# Patient Record
Sex: MALE | Race: WHITE | Employment: UNEMPLOYED | URBAN - METROPOLITAN AREA
[De-identification: names, ages, dates, MRNs, and addresses within clinical notes are randomized per-mention and may not be internally consistent; named-entity substitution may affect disease eponyms.]

---

## 2018-07-16 ENCOUNTER — HOSPITAL ENCOUNTER (EMERGENCY)
Facility: CLINIC | Age: 58
Discharge: HOME | End: 2018-07-16
Attending: INTERNAL MEDICINE | Admitting: INTERNAL MEDICINE
Payer: COMMERCIAL

## 2018-07-16 ENCOUNTER — APPOINTMENT (OUTPATIENT)
Dept: CT IMAGING | Facility: CLINIC | Age: 58
End: 2018-07-16
Attending: INTERNAL MEDICINE
Payer: COMMERCIAL

## 2018-07-16 VITALS
TEMPERATURE: 98.4 F | SYSTOLIC BLOOD PRESSURE: 131 MMHG | RESPIRATION RATE: 12 BRPM | HEART RATE: 76 BPM | OXYGEN SATURATION: 96 % | DIASTOLIC BLOOD PRESSURE: 88 MMHG

## 2018-07-16 DIAGNOSIS — R91.8 PULMONARY NODULES: ICD-10-CM

## 2018-07-16 DIAGNOSIS — R07.9 ACUTE CHEST PAIN: ICD-10-CM

## 2018-07-16 LAB
ALBUMIN SERPL-MCNC: 4.4 G/DL (ref 3.4–5)
ALP SERPL-CCNC: 48 U/L (ref 40–150)
ALT SERPL W P-5'-P-CCNC: 42 U/L (ref 0–70)
ANION GAP SERPL CALCULATED.3IONS-SCNC: 8 MMOL/L (ref 3–14)
AST SERPL W P-5'-P-CCNC: 26 U/L (ref 0–45)
BASOPHILS # BLD AUTO: 0.1 10E9/L (ref 0–0.2)
BASOPHILS NFR BLD AUTO: 0.9 %
BILIRUB SERPL-MCNC: 0.9 MG/DL (ref 0.2–1.3)
BUN SERPL-MCNC: 15 MG/DL (ref 7–30)
CALCIUM SERPL-MCNC: 9.2 MG/DL (ref 8.5–10.1)
CHLORIDE SERPL-SCNC: 105 MMOL/L (ref 94–109)
CO2 SERPL-SCNC: 25 MMOL/L (ref 20–32)
CREAT BLD-MCNC: 0.8 MG/DL (ref 0.66–1.25)
CREAT SERPL-MCNC: 0.79 MG/DL (ref 0.66–1.25)
DIFFERENTIAL METHOD BLD: NORMAL
EOSINOPHIL # BLD AUTO: 0.1 10E9/L (ref 0–0.7)
EOSINOPHIL NFR BLD AUTO: 1.4 %
ERYTHROCYTE [DISTWIDTH] IN BLOOD BY AUTOMATED COUNT: 13.4 % (ref 10–15)
GFR SERPL CREATININE-BSD FRML MDRD: >90 ML/MIN/1.7M2
GFR SERPL CREATININE-BSD FRML MDRD: >90 ML/MIN/1.7M2
GLUCOSE SERPL-MCNC: 100 MG/DL (ref 70–99)
HCT VFR BLD AUTO: 51 % (ref 40–53)
HGB BLD-MCNC: 16.7 G/DL (ref 13.3–17.7)
IMM GRANULOCYTES # BLD: 0 10E9/L (ref 0–0.4)
IMM GRANULOCYTES NFR BLD: 0.3 %
LYMPHOCYTES # BLD AUTO: 3.1 10E9/L (ref 0.8–5.3)
LYMPHOCYTES NFR BLD AUTO: 32 %
MCH RBC QN AUTO: 30.5 PG (ref 26.5–33)
MCHC RBC AUTO-ENTMCNC: 32.7 G/DL (ref 31.5–36.5)
MCV RBC AUTO: 93 FL (ref 78–100)
MONOCYTES # BLD AUTO: 0.8 10E9/L (ref 0–1.3)
MONOCYTES NFR BLD AUTO: 8.3 %
NEUTROPHILS # BLD AUTO: 5.5 10E9/L (ref 1.6–8.3)
NEUTROPHILS NFR BLD AUTO: 57.1 %
NRBC # BLD AUTO: 0 10*3/UL
NRBC BLD AUTO-RTO: 0 /100
PLATELET # BLD AUTO: 266 10E9/L (ref 150–450)
POTASSIUM SERPL-SCNC: 3.8 MMOL/L (ref 3.4–5.3)
PROT SERPL-MCNC: 7.9 G/DL (ref 6.8–8.8)
RBC # BLD AUTO: 5.48 10E12/L (ref 4.4–5.9)
SODIUM SERPL-SCNC: 138 MMOL/L (ref 133–144)
TROPONIN I SERPL-MCNC: <0.015 UG/L (ref 0–0.04)
WBC # BLD AUTO: 9.6 10E9/L (ref 4–11)

## 2018-07-16 PROCEDURE — 99285 EMERGENCY DEPT VISIT HI MDM: CPT | Mod: 25

## 2018-07-16 PROCEDURE — 80053 COMPREHEN METABOLIC PANEL: CPT | Performed by: INTERNAL MEDICINE

## 2018-07-16 PROCEDURE — 71260 CT THORAX DX C+: CPT

## 2018-07-16 PROCEDURE — 25000128 H RX IP 250 OP 636: Performed by: INTERNAL MEDICINE

## 2018-07-16 PROCEDURE — 85025 COMPLETE CBC W/AUTO DIFF WBC: CPT | Performed by: INTERNAL MEDICINE

## 2018-07-16 PROCEDURE — 82565 ASSAY OF CREATININE: CPT

## 2018-07-16 PROCEDURE — 84484 ASSAY OF TROPONIN QUANT: CPT | Performed by: INTERNAL MEDICINE

## 2018-07-16 PROCEDURE — 93005 ELECTROCARDIOGRAM TRACING: CPT

## 2018-07-16 RX ORDER — IOPAMIDOL 755 MG/ML
500 INJECTION, SOLUTION INTRAVASCULAR ONCE
Status: COMPLETED | OUTPATIENT
Start: 2018-07-16 | End: 2018-07-16

## 2018-07-16 RX ORDER — PANTOPRAZOLE SODIUM 40 MG/1
40 TABLET, DELAYED RELEASE ORAL DAILY
Qty: 30 TABLET | Refills: 0 | Status: SHIPPED | OUTPATIENT
Start: 2018-07-16 | End: 2019-02-07

## 2018-07-16 RX ADMIN — SODIUM CHLORIDE 80 ML: 900 INJECTION, SOLUTION INTRAVENOUS at 18:53

## 2018-07-16 RX ADMIN — IOPAMIDOL 66 ML: 755 INJECTION, SOLUTION INTRAVENOUS at 18:53

## 2018-07-16 ASSESSMENT — ENCOUNTER SYMPTOMS: NUMBNESS: 1

## 2018-07-16 NOTE — ED AVS SNAPSHOT
[facility]    [address] [place] [address]    Phone:  [phone]    Fax:  [phone]                                       Bhavin Awad   MRN: [id number]    Department:  [facility]   Date of Visit:  7/16/2018           Patient Information     Date Of Birth          1960        Your diagnoses for this visit were:     Acute chest pain     Pulmonary nodules        You were seen by Cassandra Salazar MD.      Follow-up Information     Follow up with [facility] In 3 days.    Specialty:  Family Medicine    Contact information:    [address] [facility] [address] [phone]        Discharge Instructions       Discharge Instructions  Chest Pain    You have been seen today for chest pain or discomfort.  At this time, your doctor has found no signs that your chest pain is due to a serious or life-threatening condition, (or you have declined more testing and/or admission to the hospital). However, sometimes there is a serious problem that does not show up right away. Your evaluation today may not be complete and you may need further testing and evaluation.     You need to follow-up with your regular doctor within 3 days.    Return to the Emergency Department if:    Your chest pain changes, gets worse, starts to happen more often, or comes with less activity.    You are short of breath.    You get very weak or tired.    You pass out or faint.    You have any new symptoms, like fever, cough, numb legs, or you cough up blood.    You have anything else that worries you.    Until you follow-up with your regular doctor please do the following:    Take one aspirin daily unless you have an allergy or are told not to by your doctor.    If a stress test appointment has been made, go to the appointment.    If you have questions, contact your regular doctor.    If your doctor today has told you to  follow-up with your regular doctor, it is very important that you make an appointment with your clinic and go to the appointment.  If you do not follow-up with your primary doctor, it may result in missing an important development which could result in permanent injury or disability and/or lasting pain.  If there is any problem keeping your appointment, call your doctor or return to the Emergency Department.    If you were given a prescription for medicine here today, be sure to read all of the information (including the package insert) that comes with your prescription.  This will include important information about the medicine, its side effects, and any warnings that you need to know about.  The pharmacist who fills the prescription can provide more information and answer questions you may have about the medicine.  If you have questions or concerns that the pharmacist cannot address, please call or return to the Emergency Department.         Remember that you can always come back to the Emergency Department if you are not able to see your regular doctor in the amount of time listed above, if you get any new symptoms, or if there is anything that worries you.        24 Hour Appointment Hotline       To make an appointment at any [facility], call [phone]). If you don't have a family doctor or clinic, we will help you find one. [place] clinics are conveniently located to serve the needs of you and your family.             Review of your medicines      START taking        Dose / Directions Last dose taken    pantoprazole 40 MG EC tablet   Commonly known as:  PROTONIX   Dose:  40 mg   Quantity:  30 tablet        Take 1 tablet (40 mg) by mouth daily for 30 doses   Refills:  0          Our records show that you are taking the medicines listed below. If these are incorrect, please call your family doctor or clinic.        Dose / Directions Last dose taken    IBUPROFEN PO        Refills:  0                 Prescriptions were sent or printed at these locations (1 Prescription)                   Other Prescriptions                Printed at Department/Unit printer (1 of 1)         pantoprazole (PROTONIX) 40 MG EC tablet                Procedures and tests performed during your visit     CBC with platelets differential    CT Chest Pulmonary Embolism w Contrast    Cardiac Continuous Monitoring    Comprehensive metabolic panel    Creatinine POCT    EKG 12 lead    ISTAT creatinine    Peripheral IV: Standard    Pulse oximetry nursing    Troponin I      Orders Needing Specimen Collection     None      Pending Results     Date and Time Order Name Status Description    7/16/2018 1814 CT Chest Pulmonary Embolism w Contrast Preliminary             Pending Culture Results     No orders found from 7/14/2018 to 7/17/2018.            Pending Results Instructions     If you had any lab results that were not finalized at the time of your Discharge, you can call the ED Lab Result RN at [phone]. You will be contacted by this team for any positive Lab results or changes in treatment. The nurses are available 7 days a week from 10A to 6:30P.  You can leave a message 24 hours per day and they will return your call.        Test Results From Your Hospital Stay        7/16/2018  6:37 PM      Component Results     Component Value Ref Range & Units Status    WBC 9.6 4.0 - 11.0 10e9/L Final    RBC Count 5.48 4.4 - 5.9 10e12/L Final    Hemoglobin 16.7 13.3 - 17.7 g/dL Final    Hematocrit 51.0 40.0 - 53.0 % Final    MCV 93 78 - 100 fl Final    MCH 30.5 26.5 - 33.0 pg Final    MCHC 32.7 31.5 - 36.5 g/dL Final    RDW 13.4 10.0 - 15.0 % Final    Platelet Count 266 150 - 450 10e9/L Final    Diff Method Automated Method  Final    % Neutrophils 57.1 % Final    % Lymphocytes 32.0 % Final    % Monocytes 8.3 % Final    % Eosinophils 1.4 % Final    % Basophils 0.9 % Final    % Immature Granulocytes 0.3 % Final    Nucleated RBCs 0 0 /100  Final    Absolute Neutrophil 5.5 1.6 - 8.3 10e9/L Final    Absolute Lymphocytes 3.1 0.8 - 5.3 10e9/L Final    Absolute Monocytes 0.8 0.0 - 1.3 10e9/L Final    Absolute Eosinophils 0.1 0.0 - 0.7 10e9/L Final    Absolute Basophils 0.1 0.0 - 0.2 10e9/L Final    Abs Immature Granulocytes 0.0 0 - 0.4 10e9/L Final    Absolute Nucleated RBC 0.0  Final         7/16/2018  6:57 PM      Component Results     Component Value Ref Range & Units Status    Troponin I ES <0.015 0.000 - 0.045 ug/L Final    The 99th percentile for upper reference range is 0.045 ug/L.  Troponin values   in the range of 0.045 - 0.120 ug/L may be associated with risks of adverse   clinical events.           7/16/2018  6:57 PM      Component Results     Component Value Ref Range & Units Status    Sodium 138 133 - 144 mmol/L Final    Potassium 3.8 3.4 - 5.3 mmol/L Final    Chloride 105 94 - 109 mmol/L Final    Carbon Dioxide 25 20 - 32 mmol/L Final    Anion Gap 8 3 - 14 mmol/L Final    Glucose 100 (H) 70 - 99 mg/dL Final    Urea Nitrogen 15 7 - 30 mg/dL Final    Creatinine 0.79 0.66 - 1.25 mg/dL Final    GFR Estimate >90 >60 mL/min/1.7m2 Final    Non  GFR Calc    GFR Estimate If Black >90 >60 mL/min/1.7m2 Final    African American GFR Calc    Calcium 9.2 8.5 - 10.1 mg/dL Final    Bilirubin Total 0.9 0.2 - 1.3 mg/dL Final    Albumin 4.4 3.4 - 5.0 g/dL Final    Protein Total 7.9 6.8 - 8.8 g/dL Final    Alkaline Phosphatase 48 40 - 150 U/L Final    ALT 42 0 - 70 U/L Final    AST 26 0 - 45 U/L Final         7/16/2018  7:16 PM      Narrative     CT CHEST PULMONARY EMBOLISM WITH CONTRAST  7/16/2018 7:05 PM    HISTORY:  Chest pain.    TECHNIQUE: Scans obtained from the apices through the diaphragm with  IV contrast. 66mL Isovue-370 injected. Radiation dose for this scan  was reduced using automated exposure control, adjustment of the mA  and/or kV according to patient size, or iterative reconstruction  technique.    COMPARISON:  None.    FINDINGS:   Evaluation of the pulmonary arterial system shows no  evidence of embolus. There is no aortic aneurysm or dissection. The  heart is at the upper limits of normal in size. There is no  mediastinal, hilar or axillary lymph node enlargement. There is a 0.7  cm right middle lobe nodule on image #75. Mild nodularity along the  right major fissure. There is a 0.6 cm nodule or scar in the inferior  lingula. Dependent atelectasis bilaterally. No pneumothorax or pleural  effusion. Images through the upper abdomen show no acute  abnormalities.        Impression     IMPRESSION:  1. There is no pulmonary embolus, aortic aneurysm or dissection.  2. Two small indeterminate lung nodules. Follow-up recommended.    Recommendations for an incidental lung nodule = or > 6mm to 8mm:    Low risk patients: Initial follow-up CT at 6-12 months, then  consider CT at 18-24 months if no change.    High risk patients: Initial follow-up CT at 6-12 months, then CT at  18-24 months if no change.    *Low Risk: Minimal or absent history of smoking or other known risk  factors.  *Nonsolid (ground-glass) or partly solid nodules may require longer  follow-up to exclude indolent adenocarcinoma.  *Recommendations based on Guidelines for the Management of Incidental  Pulmonary Nodules Detected at CT: From the Fleischner Society 2017,  Radiology 2017.         7/16/2018  6:34 PM      Component Results     Component Value Ref Range & Units Status    Creatinine 0.8 0.66 - 1.25 mg/dL Final    GFR Estimate >90 >60 mL/min/1.7m2 Final    GFR Estimate If Black >90 >60 mL/min/1.7m2 Final                Clinical Quality Measure: Blood Pressure Screening     Your blood pressure was checked while you were in the emergency department today. The last reading we obtained was  BP: 129/78 . Please read the guidelines below about what these numbers mean and what you should do about them.  If your systolic blood pressure (the top number) is less than 120 and your diastolic  "blood pressure (the bottom number) is less than 80, then your blood pressure is normal. There is nothing more that you need to do about it.  If your systolic blood pressure (the top number) is 120-139 or your diastolic blood pressure (the bottom number) is 80-89, your blood pressure may be higher than it should be. You should have your blood pressure rechecked within a year by a primary care provider.  If your systolic blood pressure (the top number) is 140 or greater or your diastolic blood pressure (the bottom number) is 90 or greater, you may have high blood pressure. High blood pressure is treatable, but if left untreated over time it can put you at risk for heart attack, stroke, or kidney failure. You should have your blood pressure rechecked by a primary care provider within the next 4 weeks.  If your provider in the emergency department today gave you specific instructions to follow-up with your doctor or provider even sooner than that, you should follow that instruction and not wait for up to 4 weeks for your follow-up visit.        Thank you for choosing [facility]       Thank you for choosing [facility] for your care. Our goal is always to provide you with excellent care. Hearing back from our patients is one way we can continue to improve our services. Please take a few minutes to complete the written survey that you may receive in the mail after you visit with us. Thank you!        [facility] Information     [facility] lets you send messages to your doctor, view your test results, renew your prescriptions, schedule appointments and more. To sign up, go to [email or web address] . Click on \"Log in\" on the left side of the screen, which will take you to the Welcome page. Then click on \"Sign up Now\" on the right side of the page.     You will be asked to enter the access code listed below, as well as some personal information. Please follow the directions to create your username and password.     Your access code " is: [email or web address]  Expires: 10/14/2018  7:48 PM     Your access code will  in 90 days. If you need help or a new code, please call your [facility] clinic or [phone].        Care EveryWhere ID     This is your Care EveryWhere ID. This could be used by other organizations to access your [facility] medical records  [id number]        Equal Access to Services     RANDA ORTIZ : Hadii renato coppolao Sobarb, waaxda luqadaha, qaybta kaalmada adeegyada, mac villalobos . So [facility] [phone].    ATENCIÓN: Si habla marryañol, tiene a navarrete disposición servicios gratuitos de asistencia lingüística. Llame al [phone].    We comply with applicable federal civil rights laws and [place] laws. We do not discriminate on the basis of race, color, national origin, age, disability, sex, sexual orientation, or gender identity.            After Visit Summary       This is your record. Keep this with you and show to your community pharmacist(s) and doctor(s) at your next visit.

## 2018-07-16 NOTE — ED TRIAGE NOTES
Patient presents to the ED with chest pain for the past week. States pain is worse with activity and improves with rest, but never completely goes away. Also reports numbness in the left arm.

## 2018-07-16 NOTE — ED PROVIDER NOTES
History     Chief Complaint:  Chest Pain    HPI   Bhavin Awad is a 57 year old male who presents to the emergency department for evaluation of chest pain. The patient states that he has had chest tightness since the 4th of July. He notes pain is worse with activity and better with rest. It is localized just to the left of the sternum. This pain is constant and has not really gone away since its onset. The patient also numbness in the left arm and a cough. He has been feeling fatigued and diaphoretic off and on all day, even while in an air conditioned room. He also feels as though he has been losing weight recently. The patient reports that he has been feeling more stressed lately. He denies any blood when he coughs.     Cardiac/PE/DVT Risk Factors:  History of hypertension - neg  History of hyperlipidemia - neg  History of diabetes - neg  History of smoking - pos  Personal history of PE/DVT - neg  Family history of PE/DVT - neg  Recent travel - neg  Recent surgery - neg  Other immobilizations - neg  Cancer - neg    Allergies:  No known Drug Allergies     Medications:    ibuprofen     Past Medical History:    Depression  Low back pain   Rotator cuff disorder    Past Surgical History:    Explore shoulder joint  Herniorrhaphy inguinal bilateral     Family History:    Father- CAD  Paternal grandfather- CAD  Mother- diabetes  Father- CV disease     Social History:  Marital Status:   [2]  Social History   Substance Use Topics     Smoking status: Current Every Day Smoker     Packs/day: 1.00     Smokeless tobacco: Never Used     Alcohol use Yes      Comment: sobriety one year        Review of Systems   Cardiovascular: Positive for chest pain.   Neurological: Positive for numbness.   All other systems reviewed and are negative.         Physical Exam   First Vitals:  BP: (!) 165/100  Pulse: 76  Heart Rate: 78  Temp: 98.4  F (36.9  C)  Resp: 18  SpO2: 100 %      Physical Exam   Constitutional: He is cooperative.    HENT:   Right Ear: Tympanic membrane normal.   Left Ear: Tympanic membrane normal.   Mouth/Throat: Oropharynx is clear and moist and mucous membranes are normal.   Eyes: Conjunctivae are normal.   Neck: Normal range of motion.   Cardiovascular: Regular rhythm and normal heart sounds.    Pulmonary/Chest: Effort normal and breath sounds normal.   Abdominal: Soft. Normal appearance and bowel sounds are normal. There is no rebound and no guarding.   Musculoskeletal: Normal range of motion.   Lymphadenopathy:     He has no cervical adenopathy.   Neurological: He is alert.   Skin: Skin is warm and dry.   Psychiatric: He has a normal mood and affect.       Emergency Department Course     ECG:  ECG taken at 1743, ECG read at 1813  Normal sinus rhythm with sinus arrhythmia   Left anterior fascicular block  Abnormal ECG  Rate 68 bpm. NM interval 170 ms. QRS duration 110 ms. QT/QTc 382/406 ms. P-R-T axes 60 -68 71.    Imaging:  Radiology findings were communicated with the patient who voiced understanding of the findings.    CT Chest Pulmonary Embolism w Contrast  Preliminary Result  IMPRESSION:  1. There is no pulmonary embolus, aortic aneurysm or dissection.  2. Two small indeterminate lung nodules. Follow-up recommended.    Recommendations for an incidental lung nodule = or > 6mm to 8mm:    Low risk patients: Initial follow-up CT at 6-12 months, then  consider CT at 18-24 months if no change.    High risk patients: Initial follow-up CT at 6-12 months, then CT at  18-24 months if no change.    *Low Risk: Minimal or absent history of smoking or other known risk  factors.  *Nonsolid (ground-glass) or partly solid nodules may require longer  follow-up to exclude indolent adenocarcinoma.  *Recommendations based on Guidelines for the Management of Incidental  Pulmonary Nodules Detected at CT: From the Fleischner Society 2017,  Radiology 2017.  Readings are preliminary at time of note     Laboratory:  Laboratory findings were  communicated with the patient who voiced understanding of the findings.    Creatinine POCT: negative  Troponin (Collected 1829): <0.015   CBC: WBC 9.6, HGB 16.7,   CMP: glucose 100 (H), o/w WNL (Creatinine 0.79)    Interventions:  1854 NS 1L IV Bolus     Emergency Department Course:  Nursing notes and vitals reviewed.  I performed an exam of the patient as documented above.   I discussed the treatment plan with the patient. They expressed understanding of this plan and consented to discharge. They will be discharged home with instructions for care and follow up. In addition, the patient will return to the emergency department if their symptoms persist, worsen, if new symptoms arise or if there is any concern.  All questions were answered.     I personally reviewed the imaging and lab results with the patient and answered all related questions prior to discharge.  Impression & Plan      Medical Decision Making:    Bhavin Awad is a 57 year old male who presents to the emergency department complaining of over 10 days of ongoing left anterior chest pain.  With constant pain for such a long period of time and normal EKG and troponin this is not consistent with cardiac ischemia.  He was not high risk for pulmonary embolus but I was concerned with his symptoms especially with his anorexia and unintentional weight loss.  CT was obtained.  This shows no evidence of PE.  There are 2 small indeterminate pulmonary nodules.  I discussed with him recommendation of follow-up CT in 6-12 months.   No other significant laboratory abnormality.  It does sound like he has frequent heartburn and this could be of esophageal origin.  I will start him on Protonix.  He mentions anxiety is a potential contributor.  I will have him follow-up with primary care to discuss this further.  Chest pain instructions, return if problems.      Diagnosis:    ICD-10-CM    1. Acute chest pain R07.9    2. Pulmonary nodules R91.8      Disposition:    Discharged     Discharge Medications:  Discharge Medication List as of 7/16/2018  7:48 PM      START taking these medications    Details   pantoprazole (PROTONIX) 40 MG EC tablet Take 1 tablet (40 mg) by mouth daily for 30 doses, Disp-30 tablet, R-0, Local Print             Scribe Disclosure:  I, Mitch Chavezabel, am serving as a scribe at 6:17 PM on 7/16/2018 to document services personally performed by Cassandra Salazar MD, based on my observations and the provider's statements to me.     [facility] EMERGENCY DEPARTMENT       Cassandra Salazar MD  07/16/18 4782

## 2018-07-16 NOTE — ED AVS SNAPSHOT
[facility]    [address]    [facility] [address]    Phone:  [phone]    Fax:  [phone]                                       Bhavin Awad   MRN: [id number]    Department:  [facility] Department   Date of Visit:  7/16/2018           After Visit Summary Signature Page     I have received my discharge instructions, and my questions have been answered. I have discussed any challenges I see with this plan with the nurse or doctor.    ..........................................................................................................................................  Patient/Patient Representative Signature      ..........................................................................................................................................  Patient Representative Print Name and Relationship to Patient    ..................................................               ................................................  Date                                            Time    ..........................................................................................................................................  Reviewed by Signature/Title    ...................................................              ..............................................  Date                                                            Time

## 2018-07-17 LAB — INTERPRETATION ECG - MUSE: NORMAL

## 2018-07-17 NOTE — DISCHARGE INSTRUCTIONS
Discharge Instructions  Chest Pain    You have been seen today for chest pain or discomfort.  At this time, your doctor has found no signs that your chest pain is due to a serious or life-threatening condition, (or you have declined more testing and/or admission to the hospital). However, sometimes there is a serious problem that does not show up right away. Your evaluation today may not be complete and you may need further testing and evaluation.     You need to follow-up with your regular doctor within 3 days.    Return to the Emergency Department if:    Your chest pain changes, gets worse, starts to happen more often, or comes with less activity.    You are short of breath.    You get very weak or tired.    You pass out or faint.    You have any new symptoms, like fever, cough, numb legs, or you cough up blood.    You have anything else that worries you.    Until you follow-up with your regular doctor please do the following:    Take one aspirin daily unless you have an allergy or are told not to by your doctor.    If a stress test appointment has been made, go to the appointment.    If you have questions, contact your regular doctor.    If your doctor today has told you to follow-up with your regular doctor, it is very important that you make an appointment with your clinic and go to the appointment.  If you do not follow-up with your primary doctor, it may result in missing an important development which could result in permanent injury or disability and/or lasting pain.  If there is any problem keeping your appointment, call your doctor or return to the Emergency Department.    If you were given a prescription for medicine here today, be sure to read all of the information (including the package insert) that comes with your prescription.  This will include important information about the medicine, its side effects, and any warnings that you need to know about.  The pharmacist who fills the prescription can  provide more information and answer questions you may have about the medicine.  If you have questions or concerns that the pharmacist cannot address, please call or return to the Emergency Department.         Remember that you can always come back to the Emergency Department if you are not able to see your regular doctor in the amount of time listed above, if you get any new symptoms, or if there is anything that worries you.

## 2018-07-24 ENCOUNTER — PATIENT OUTREACH (OUTPATIENT)
Dept: CARE COORDINATION | Facility: CLINIC | Age: 58
End: 2018-07-24

## 2018-07-24 ENCOUNTER — OFFICE VISIT (OUTPATIENT)
Dept: FAMILY MEDICINE | Facility: CLINIC | Age: 58
End: 2018-07-24
Payer: COMMERCIAL

## 2018-07-24 VITALS
WEIGHT: 184 LBS | BODY MASS INDEX: 27.25 KG/M2 | DIASTOLIC BLOOD PRESSURE: 98 MMHG | HEIGHT: 69 IN | SYSTOLIC BLOOD PRESSURE: 142 MMHG | HEART RATE: 68 BPM | TEMPERATURE: 98 F | RESPIRATION RATE: 18 BRPM

## 2018-07-24 DIAGNOSIS — Z23 NEED FOR PROPHYLACTIC VACCINATION WITH TETANUS-DIPHTHERIA (TD): ICD-10-CM

## 2018-07-24 DIAGNOSIS — Z11.59 NEED FOR HEPATITIS C SCREENING TEST: ICD-10-CM

## 2018-07-24 DIAGNOSIS — R07.89 NON-CARDIAC CHEST PAIN: Primary | ICD-10-CM

## 2018-07-24 DIAGNOSIS — M54.2 CERVICALGIA: ICD-10-CM

## 2018-07-24 DIAGNOSIS — M25.512 LEFT SHOULDER PAIN, UNSPECIFIED CHRONICITY: ICD-10-CM

## 2018-07-24 DIAGNOSIS — Z23 NEED FOR VACCINATION: ICD-10-CM

## 2018-07-24 DIAGNOSIS — Z59.00 HOMELESS: ICD-10-CM

## 2018-07-24 DIAGNOSIS — Z12.11 SCREEN FOR COLON CANCER: ICD-10-CM

## 2018-07-24 DIAGNOSIS — M25.561 RIGHT KNEE PAIN, UNSPECIFIED CHRONICITY: ICD-10-CM

## 2018-07-24 DIAGNOSIS — F33.9 RECURRENT MAJOR DEPRESSIVE DISORDER, REMISSION STATUS UNSPECIFIED (H): ICD-10-CM

## 2018-07-24 DIAGNOSIS — F81.9 LEARNING DISABILITY: ICD-10-CM

## 2018-07-24 DIAGNOSIS — M25.562 LEFT KNEE PAIN, UNSPECIFIED CHRONICITY: ICD-10-CM

## 2018-07-24 DIAGNOSIS — Z11.4 SCREENING FOR HIV (HUMAN IMMUNODEFICIENCY VIRUS): ICD-10-CM

## 2018-07-24 DIAGNOSIS — R91.8 PULMONARY NODULES: ICD-10-CM

## 2018-07-24 PROCEDURE — 90732 PPSV23 VACC 2 YRS+ SUBQ/IM: CPT | Performed by: FAMILY MEDICINE

## 2018-07-24 PROCEDURE — 90715 TDAP VACCINE 7 YRS/> IM: CPT | Performed by: FAMILY MEDICINE

## 2018-07-24 PROCEDURE — 90471 IMMUNIZATION ADMIN: CPT | Performed by: FAMILY MEDICINE

## 2018-07-24 PROCEDURE — 87389 HIV-1 AG W/HIV-1&-2 AB AG IA: CPT | Performed by: FAMILY MEDICINE

## 2018-07-24 PROCEDURE — 99214 OFFICE O/P EST MOD 30 MIN: CPT | Mod: 25 | Performed by: FAMILY MEDICINE

## 2018-07-24 PROCEDURE — 36415 COLL VENOUS BLD VENIPUNCTURE: CPT | Performed by: FAMILY MEDICINE

## 2018-07-24 PROCEDURE — 90472 IMMUNIZATION ADMIN EACH ADD: CPT | Performed by: FAMILY MEDICINE

## 2018-07-24 PROCEDURE — 86803 HEPATITIS C AB TEST: CPT | Performed by: FAMILY MEDICINE

## 2018-07-24 SDOH — ECONOMIC STABILITY - HOUSING INSECURITY: HOMELESSNESS UNSPECIFIED: Z59.00

## 2018-07-24 ASSESSMENT — ANXIETY QUESTIONNAIRES
2. NOT BEING ABLE TO STOP OR CONTROL WORRYING: MORE THAN HALF THE DAYS
6. BECOMING EASILY ANNOYED OR IRRITABLE: NEARLY EVERY DAY
IF YOU CHECKED OFF ANY PROBLEMS ON THIS QUESTIONNAIRE, HOW DIFFICULT HAVE THESE PROBLEMS MADE IT FOR YOU TO DO YOUR WORK, TAKE CARE OF THINGS AT HOME, OR GET ALONG WITH OTHER PEOPLE: VERY DIFFICULT
1. FEELING NERVOUS, ANXIOUS, OR ON EDGE: MORE THAN HALF THE DAYS
GAD7 TOTAL SCORE: 15
5. BEING SO RESTLESS THAT IT IS HARD TO SIT STILL: MORE THAN HALF THE DAYS
3. WORRYING TOO MUCH ABOUT DIFFERENT THINGS: MORE THAN HALF THE DAYS
7. FEELING AFRAID AS IF SOMETHING AWFUL MIGHT HAPPEN: MORE THAN HALF THE DAYS

## 2018-07-24 ASSESSMENT — ACTIVITIES OF DAILY LIVING (ADL): DEPENDENT_IADLS:: TRANSPORTATION;INDEPENDENT

## 2018-07-24 ASSESSMENT — PATIENT HEALTH QUESTIONNAIRE - PHQ9: 5. POOR APPETITE OR OVEREATING: MORE THAN HALF THE DAYS

## 2018-07-24 NOTE — LETTER
July 26, 2018      Bhavin Awad  [id number] [facility] [address]        Dear ,    We are writing to inform you of your test results.    These tests are all normal. GREAT!    Resulted Orders   Hepatitis C Screen Reflex to HCV RNA Quant and Genotype   Result Value Ref Range    Hepatitis C Antibody Nonreactive NR^Nonreactive      Comment:      Assay performance characteristics have not been established for newborns,   infants, and children     HIV Screening   Result Value Ref Range    HIV Antigen Antibody Combo Nonreactive NR^Nonreactive          Comment:      HIV-1 p24 Ag & HIV-1/HIV-2 Ab Not Detected       If you have any questions or concerns, please call the clinic at the number listed above.       Sincerely,        Andres Leal MD/LF
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
## 2018-07-24 NOTE — PROGRESS NOTES
SUBJECTIVE:   Bhavin Awad is a 57 year old male who presents to clinic today for the following health issues:      ED/UC Followup:    Facility:  [facility]  Date of visit: 7/16/18  Reason for visit: Chest pain   Current Status: Comes and goes          Abnormal Mood Symptoms  Onset: 3 years Dx psychotic depression 2004    Description:   Depression: YES  Anxiety: YES    Accompanying Signs & Symptoms:  Still participating in activities that you used to enjoy: no  Fatigue: YES  Irritability: YES  Difficulty concentrating: YES  Changes in appetite: YES  Problems with sleep: YES  Heart racing/beating fast : YES  Thoughts of hurting yourself or others: attempt made 2 twice 2000's     History:   Recent stress: YES  Prior depression hospitalization: early 2000's   Family history of depression: no   Family history of anxiety: no     Precipitating factors:   Alcohol/drug use: YES    Alleviating factors:      Therapies Tried and outcome:     Problem list and histories reviewed & adjusted, as indicated.  Additional history: none        Reviewed and updated as needed this visit by clinical staff  Tobacco  Allergies  Meds  Med Hx  Surg Hx  Fam Hx  Soc Hx      Reviewed and updated as needed this visit by Provider         Non-cardiac chest pain  (primary encounter diagnosis)  He has yet to get his Rx PPI  Pulmonary nodules f/u CT 6 mos  Learning disability he reports math reading comprehension issues life long  Recurrent major depressive disorder, remission status unspecified (H)   PHQ-9 SCORE 7/24/2018   Total Score 19       Screen for colon cancer needed  Need for hepatitis C screening test needed  Screening for HIV (human immunodeficiency virus) needed  Need for prophylactic vaccination with tetanus-diphtheria (TD) needed  Homeless living with son, he must leave by end August  Cervicalgia  Left shoulder pain, unspecified chronicity  Left knee pain, unspecified chronicity  Right knee pain, unspecified chronicity He complains  "of various MSK pains, noted: not addressed today  Need for vaccination.hist    Past Medical History:   Diagnosis Date     Cervicalgia 2018     DEPRESS PSYCHOSIS-SEVERE(aka DEPRESSION) 2004    admitted [facility] in 2004 , dr gonzales psychiatrist, significant suicidal ideation, and attempts       Homeless 2018     LBP (low back pain)      Learning disability 2018     Left knee pain, unspecified chronicity 2018     Left shoulder pain, unspecified chronicity 2018     Non-cardiac chest pain 2018     Pulmonary nodules 2018     Right knee pain, unspecified chronicity 2018     Rotator cuff disorder        Past Surgical History:   Procedure Laterality Date     C EXPLORE SHOULDER JOINT      Arthroplasty, Shoulder, simonet, rotator cuff     HERNIORRHAPHY INGUINAL BILATERAL Bilateral 2015    Procedure: HERNIORRHAPHY INGUINAL BILATERAL;  Surgeon: Luís Conti MD;  Location: [facility] OR       Family History   Problem Relation Age of Onset     C.A.D. Father       age 66 MI      Cerebrovascular Disease Father      C.A.CAMRON. Paternal Grandfather       in his 50 heart disease     Diabetes Mother        Social History   Substance Use Topics     Smoking status: Current Every Day Smoker     Packs/day: 1.00     Smokeless tobacco: Never Used     Alcohol use No      Comment: sobriety one year   unemployed no insurance    ROS no fevers dysphagia suicidality. High anxiety    BP (!) 142/98 (BP Location: Right arm, Patient Position: Chair, Cuff Size: Adult Large)  Pulse 68  Temp 98  F (36.7  C) (Oral)  Resp 18  Ht 5' 9\" (1.753 m)  Wt 184 lb (83.5 kg)  BMI 27.17 kg/m2  No synovitis  RRR  Hyper vigilant  Poor dentition    ASSESSMENT / PLAN:  (R07.89) Non-cardiac chest pain  (primary encounter diagnosis)  Comment: ED eval reassuring  Plan: he cannot afford his PPI.  See below    (R91.8) Pulmonary nodules  Comment: smoker  Plan: repeat CT January    (F81.9) " Learning disability  Comment: MH issue   Plan: CARE COORDINATION REFERRAL, MENTAL HEALTH         REFERRAL  - Adult; Outpatient Treatment;         Individual/Couples/Family/Group Therapy/Health         Psychology; [facility]: [facility]         [phone]; We will contact you to schedule        the appointment or please call with any         questions        Interested in counseling    (F33.9) Recurrent major depressive disorder, remission status unspecified (H)  Comment: on no therapies  Plan: CARE COORDINATION REFERRAL, MENTAL HEALTH         REFERRAL  - Adult; Outpatient Treatment;         Individual/Couples/Family/Group Therapy/Health         Psychology; [facility]: [facility]         [phone]; We will contact you to schedule        the appointment or please call with any         questions        Interested in counseling    (Z12.11) Screen for colon cancer  Comment:due  Plan: Fecal colorectal cancer screen FIT - Future         (S+30)        dispense    (Z11.59) Need for hepatitis C screening test  Comment: routine  Plan: Hepatitis C Screen Reflex to HCV RNA Quant and         Genotype        Hep C  testing introduced, rationale reviewed, all questions answered, permission granted to test      (Z11.4) Screening for HIV (human immunodeficiency virus)  Comment: routine  Plan: HIV Screening        Universal HIV testing introduced, rationale reviewed, all questions answered, permission granted to test      (Z23) Need for prophylactic vaccination with tetanus-diphtheria (TD)  Comment: see below  Plan: CANCELED: TDAP VACCINE (BOOSTRIX)            (Z59.0) Homeless  Comment: [facility] coordination  Plan:     (M54.2) Cervicalgia  Comment:  Long standing, not urgent  Plan:  to be addressed at another visit    (N85.512) Left shoulder pain, unspecified chronicity  Comment:  Long standing, not urgent  Plan:  to be addressed at another visit*    (M25.562) Left knee pain, unspecified chronicity  Comment:   Long standing, not urgent  Plan:  to be addressed at another visit*    (M25.561) Right knee pain, unspecified chronicity  Comment:  Long standing, not urgent  Plan:  to be addressed at another visit    (Z23) Need for vaccination  Comment:offered  Plan: TDAP VACCINE (ADACEL), Pneumococcal vaccine 23         valent PPSV23  (Pneumovax) [56593], ADMIN:         Vaccine, Initial (09594)        Will accept            Andres Leal MD

## 2018-07-24 NOTE — MR AVS SNAPSHOT
After Visit Summary   7/24/2018    Bhavin Awad    MRN: [id number]           Patient Information     Date Of Birth          1960        Visit Information        Provider Department      7/24/2018 10:00 AM Andres Leal MD [facility]        Today's Diagnoses     Non-cardiac chest pain    -  1    Pulmonary nodules        Learning disability        Recurrent major depressive disorder, remission status unspecified (H)        Screen for colon cancer        Need for hepatitis C screening test        Screening for HIV (human immunodeficiency virus)        Need for prophylactic vaccination with tetanus-diphtheria (TD)        Homeless        Cervicalgia        Left shoulder pain, unspecified chronicity        Left knee pain, unspecified chronicity        Right knee pain, unspecified chronicity        Need for vaccination           Follow-ups after your visit        Additional Services     CARE COORDINATION REFERRAL       Services are provided by a Care Coordinator for people with complex needs such as: medical, social, or financial troubles.  The Care Coordinator works with the patient and their Primary Care Provider to determine health goals, obtain resources, achieve outcomes, and develop care plans that help coordinate the patient's care.     Reason for Referral: Homeless unemployed learning disability    Additional pertinent details:      Clinical Staff have discussed the Care Coordination Referral with the patient and/or caregiver: yes            MENTAL HEALTH REFERRAL  - Adult; Outpatient Treatment; Individual/Couples/Family/Group Therapy/Health Psychology; [facility]: [facility] [phone]; We will contact you to schedule the appointment or please call with any questions       All scheduling is subject to the client's specific insurance plan & benefits, provider/location availability, and provider clinical specialities.  Please arrive 15 minutes early for your  "first appointment and bring your completed paperwork.    Please be aware that coverage of these services is subject to the terms and limitations of your health insurance plan.  Call member services at your health plan with any benefit or coverage questions.                            Follow-up notes from your care team     Return in about 6 weeks (around 9/4/2018).      Your next 10 appointments already scheduled     Sep 04, 2018  9:00 AM CDT   SHORT with Andres Leal MD   [facility]    [address] [place] [id number] [phone]              Future tests that were ordered for you today     Open Future Orders        Priority Expected Expires Ordered    Fecal colorectal cancer screen FIT - Future (S+30) Routine 8/14/2018 8/23/2018 7/24/2018            Who to contact     If you have questions or need follow up information about today's clinic visit or your schedule please contact [facility] directly at [phone].  Normal or non-critical lab and imaging results will be communicated to you by MyChart, letter or phone within 4 business days after the clinic has received the results. If you do not hear from us within 7 days, please contact the clinic through [facility] or phone. If you have a critical or abnormal lab result, we will notify you by phone as soon as possible.  Submit refill requests through [facility] or call your pharmacy and they will forward the refill request to us. Please allow 3 business days for your refill to be completed.          Additional Information About Your Visit        [facility] Information     [facility] lets you send messages to your doctor, view your test results, renew your prescriptions, schedule appointments and more. To sign up, go to [email or web address] . Click on \"Log in\" on the left side of the screen, which will take you to the Welcome page. Then click on \"Sign up Now\" on the right side of " "the page.     You will be asked to enter the access code listed below, as well as some personal information. Please follow the directions to create your username and password.     Your access code is: [email or web address]  Expires: 10/14/2018  7:48 PM     Your access code will  in 90 days. If you need help or a new code, please call your [facility] clinic or [phone].        Care EveryWhere ID     This is your Care EveryWhere ID. This could be used by other organizations to access your [facility] medical records  [id number]        Your Vitals Were     Pulse Temperature Respirations Height BMI (Body Mass Index)       68 98  F (36.7  C) (Oral) 18 5' 9\" (1.753 m) 27.17 kg/m2        Blood Pressure from Last 3 Encounters:   18 (!) 142/98   18 131/88   08/17/15 118/70    Weight from Last 3 Encounters:   18 184 lb (83.5 kg)   08/17/15 171 lb (77.6 kg)   07/28/15 166 lb (75.3 kg)              We Performed the Following     ADMIN: Vaccine, Initial (27294)     CARE COORDINATION REFERRAL     Hepatitis C Screen Reflex to HCV RNA Quant and Genotype     HIV Screening     MENTAL HEALTH REFERRAL  - Adult; Outpatient Treatment; Individual/Couples/Family/Group Therapy/Health Psychology; G: [facility] [phone]; We will contact you to schedule the appointment or please call with any questions     Pneumococcal vaccine 23 valent PPSV23  (Pneumovax) [62500]     TDAP VACCINE (ADACEL)        Primary Care Provider Fax #    Physician No Ref-Primary [phone]       No address on file        Equal Access to Services     RANDA ORTIZ : mary Shepherd, mac viera. So [facility] [phone].    ATENCIÓN: Si habla español, tiene a navarrete disposición servicios gratuitos de asistencia lingüística. Llame al [phone].    We comply with applicable federal civil rights laws and [place] laws. We do not discriminate on the " basis of race, color, national origin, age, disability, sex, sexual orientation, or gender identity.            Thank you!     Thank you for choosing [facility]  for your care. Our goal is always to provide you with excellent care. Hearing back from our patients is one way we can continue to improve our services. Please take a few minutes to complete the written survey that you may receive in the mail after your visit with us. Thank you!             Your Updated Medication List - Protect others around you: Learn how to safely use, store and throw away your medicines at www.disposemymeds.org.          This list is accurate as of 7/24/18 11:59 PM.  Always use your most recent med list.                   Brand Name Dispense Instructions for use Diagnosis    ASPIRIN PO      Take 324 mg by mouth every 4 hours as needed for moderate pain        pantoprazole 40 MG EC tablet    PROTONIX    30 tablet    Take 1 tablet (40 mg) by mouth daily for 30 doses

## 2018-07-25 ENCOUNTER — PATIENT OUTREACH (OUTPATIENT)
Dept: CARE COORDINATION | Facility: CLINIC | Age: 58
End: 2018-07-25

## 2018-07-25 LAB
HCV AB SERPL QL IA: NONREACTIVE
HIV 1+2 AB+HIV1 P24 AG SERPL QL IA: NONREACTIVE

## 2018-07-25 ASSESSMENT — ANXIETY QUESTIONNAIRES: GAD7 TOTAL SCORE: 15

## 2018-07-25 ASSESSMENT — PATIENT HEALTH QUESTIONNAIRE - PHQ9: SUM OF ALL RESPONSES TO PHQ QUESTIONS 1-9: 19

## 2018-07-25 NOTE — PROGRESS NOTES
Clinic Care Coordination Contact  [facility]/Voicemail       Clinical Data: Care Coordinator Outreach per RNCC request to follow up with Pt regarding financial concerns.  RNCC met with Pt in clinic yesterday, 7/24/18 and Pt reported he was interested in looking into financial supports with the county and applying for social security.  Per notes in chart, RNCC provided Pt with the disability linkage line to assist with social security process.  Per notes in chart, PCP also placed a MH referral for Pt as he was interested in following up with a therapist.  Outreached to follow up on these resources per RNCC request and to provide additional supportive resources if needed.      Outreach attempted x 1.  No voicemail set up.      Plan: Care Coordinator will attempt to follow up with Pt again if no return call.      Clinic Care Coordination Contact    Clinic Care Coordination Contact  OUTREACH    Referral Information: RNCC met with Pt and requested [facility] outreach to discuss financial resources.  Outreached to Pt and he was open to outreach.      Primary Diagnosis: Psychosocial    Chief Complaint   Patient presents with     Clinic Care Coordination - Follow-up     Follow up per RNCC request regardinig financial concerns.          Universal Utilization: Pt sees Dr. Leal at [facility].    Clinic Utilization  Difficulty keeping appointments:: No  Utilization    Last refreshed: 7/26/2018  8:01 AM:  No Show Count (past year) 0       Last refreshed: 7/26/2018  8:01 AM:  ED visits 1       Last refreshed: 7/26/2018  8:01 AM:  Hospital admissions 0          Current as of: 7/26/2018  8:01 AM           Clinical Concerns:  Current Medical Concerns:  Pt follows up with PCP for medical health concerns.  Pt was seen by PCP on 7/24/18 to follow up on recent ED visit d/t acute chest pain.  Pt has a follow up PCP appointment scheduled on 9/4/18 as well. Pt can follow up with clinic directly if he has questions or concerns before  his next appointment.       Current Behavioral Concerns: Pt has reported some concerns with depression and feeling down because he has been asked to leave his son's home in the future.  Pt reported today that he is feeling safe and had no concerns of harming himself.  Pt was referred to [facility] for therapy and Pt is in agreement to talk with a therapist for support.  [facility] is to call Pt to set up intake appointment and Pt was encouraged to outreach to counseling center if he does not hear back from center soon.       Education Provided to patient: Education on rides, financial assistance, and therapy appointment was provided to Pt today.       Health Maintenance Reviewed: Up to date  Clinical Pathway: None     Medication Management:  Managed by PCP.  No medication questions from Pt today.     Living Situation: Pt is currently residing with his son and daughter in law.  Pt reported that he has been asked to find a different residence in the future and this appears to be causing Pt some stress.  Pt plans to talk with his financial worker this week to inquire abbot additional financial resources and options for support.       Diet/Exercise/Sleep: No atddressed today.        Transportation:  Transportation concerns (GOAL):: Yes  Transportation means:: Family, Medical transport  Goal created for transportation.  Pt reported his family used to drive him to appointments, but this is becoming more difficult.  [employer] [place] transportation number and services provided to Pt today.       Psychosocial:  Financial/Insurance: [employer] [place]   Financial/Insurance concerns (GOAL):: Yes  Financial goal created with Pt today.  He plans to outreach to his financial worker with the [facility] this week to obtain information on services available to him.       Resources and Interventions:  Current Resources: Financial worker with the [facility],  Medical transport with his insurance.   therapist in process of  being set up.       Goals:   Goals        General    1: Financial Wellbeing (pt-stated)     Notes - Note created  7/26/2018 10:52 AM by Iman Dumont LSW    Goal Statement: I will obtain additional financial resources within 1-3 months to support my overall health and to obtain alternative housing.    Measure of Success: I will verbally report my updates to the care coordination team and PCP.   Supportive Steps to Achieve: I will outreach to my financial worker this week and inquire about cash assistance and have this worker send me the necessary forms to obtain additional support.  I will inquire about housing assistance as well when I talk with my financial worker.  I have agreed to outreach to the care coordination team after I talk with my financial worker if questions arise.   Barriers: I am currently residing with my son and may need to leave this residence in the future.    Strengths: I appear motivated to achieve additional financial supports and resources.    Date to Achieve By: 9/1/18        2: Transportation (pt-stated)     Notes - Note created  7/26/2018 10:56 AM by Iman Dumont LSW    Goal Statement: I will obtain rides through my insurance in 1-2 weeks for all upcoming medical appointments to ensure I am able to follow up with providers as recommended.    Measure of Success: I will verbally update care team after I talk with insurance or if questions arise.    Supportive Steps to Achieve: [facility] provided me with the [facility] [place] number to call today to line up medical rides. I was educated on this service today and reminded of the timeline to set up rides.  I can outreach to care coordination team if I have questions or concerns regarding transportation.   Barriers: Family rides are becoming difficult to line up, therefore I am looking for additional resources.    Strengths: I appear motivated to attend all of my medical and MH appointments.    Date to Achieve By: 8/15/18                 Patient/Caregiver understanding: Pt voiced his understanding today and was thankful for the follow up call.     Outreach Frequency: monthly  Future Appointments              In 1 month Andres Leal MD [facility]          Plan: Pt will outreach to financial worker and [employer] for rides.  He will schedule first intake appointment with [facility] when they outreach to him.  Care coordination team can follow up with Pt to check on status of connection with financial worker and rides.  Pt was in agreement with this plan and agreed to outreach before that time if needed.

## 2018-08-14 ENCOUNTER — PATIENT OUTREACH (OUTPATIENT)
Dept: CARE COORDINATION | Facility: CLINIC | Age: 58
End: 2018-08-14

## 2018-08-27 ENCOUNTER — PATIENT OUTREACH (OUTPATIENT)
Dept: CARE COORDINATION | Facility: CLINIC | Age: 58
End: 2018-08-27

## 2018-08-27 ASSESSMENT — ACTIVITIES OF DAILY LIVING (ADL): DEPENDENT_IADLS:: TRANSPORTATION;INDEPENDENT

## 2018-08-27 NOTE — PROGRESS NOTES
Clinic Care Coordination Contact  [facility]/Voicemail    Referral Source: Care Team (Dr. Leal )  Clinical Data: Care Coordinator Outreach - follow up on financial and mental health     Outreach attempted x 1.  Received voicemail - however message does not allow any message to be left for patient     Plan:  Care Coordinator will try to reach patient again in 1-2 business days.    Gerda Genao Care Coordinator RN  [facility]  [phone]  August 27, 2018

## 2018-08-27 NOTE — LETTER
[place] CARE COORDINATION  [facility]   [id number] [place] [address]. [address]  [phone]    August 28, 2018    Bhavin Awad  [address] [address] [address]      Dear Bhavin,    I am a clinic care coordinator who works with [facility]. I recently tried to call and was unable to reach you. I wanted to introduce myself and provide you with my contact information so that you can call me with questions or concerns about your health care. Below is a description of clinic care coordination and how I can further assist you.     The clinic care coordinator is a registered nurse and/or  who understand the health care system. The goal of clinic care coordination is to help you manage your health and improve access to the [facility] in the most efficient manner. The registered nurse can assist you in meeting your health care goals by providing education, coordinating services, and strengthening the communication among your providers. The  can assist you with financial, behavioral, psychosocial, chemical dependency, counseling, and/or psychiatric resources.    Please feel free to contact me at [phone], with any questions or concerns. We at [facility] are focused on providing you with the highest-quality healthcare experience possible and that all starts with you.     Sincerely,     Gerda Genao Care Coordinator RN  [facility]  [phone]  August 28, 2018

## 2018-08-28 ENCOUNTER — OFFICE VISIT (OUTPATIENT)
Dept: PSYCHOLOGY | Facility: CLINIC | Age: 58
End: 2018-08-28
Attending: FAMILY MEDICINE
Payer: COMMERCIAL

## 2018-08-28 DIAGNOSIS — F32.9 MAJOR DEPRESSIVE DISORDER: Primary | ICD-10-CM

## 2018-08-28 DIAGNOSIS — F41.1 GENERALIZED ANXIETY DISORDER: ICD-10-CM

## 2018-08-28 PROCEDURE — 90791 PSYCH DIAGNOSTIC EVALUATION: CPT | Performed by: PSYCHOLOGIST

## 2018-08-28 ASSESSMENT — ACTIVITIES OF DAILY LIVING (ADL): DEPENDENT_IADLS:: TRANSPORTATION;INDEPENDENT

## 2018-08-28 ASSESSMENT — ANXIETY QUESTIONNAIRES
7. FEELING AFRAID AS IF SOMETHING AWFUL MIGHT HAPPEN: MORE THAN HALF THE DAYS
GAD7 TOTAL SCORE: 19
6. BECOMING EASILY ANNOYED OR IRRITABLE: NEARLY EVERY DAY
2. NOT BEING ABLE TO STOP OR CONTROL WORRYING: NEARLY EVERY DAY
5. BEING SO RESTLESS THAT IT IS HARD TO SIT STILL: MORE THAN HALF THE DAYS
3. WORRYING TOO MUCH ABOUT DIFFERENT THINGS: NEARLY EVERY DAY
IF YOU CHECKED OFF ANY PROBLEMS ON THIS QUESTIONNAIRE, HOW DIFFICULT HAVE THESE PROBLEMS MADE IT FOR YOU TO DO YOUR WORK, TAKE CARE OF THINGS AT HOME, OR GET ALONG WITH OTHER PEOPLE: VERY DIFFICULT
1. FEELING NERVOUS, ANXIOUS, OR ON EDGE: NEARLY EVERY DAY

## 2018-08-28 ASSESSMENT — PATIENT HEALTH QUESTIONNAIRE - PHQ9: 5. POOR APPETITE OR OVEREATING: NEARLY EVERY DAY

## 2018-08-28 NOTE — PROGRESS NOTES
Clinic Care Coordination Contact  [facility]/Voicemail    Referral Source: Care Team (Dr. Leal )  Clinical Data: Care Coordinator Outreach -  follow up on financial and mental health     Outreach attempted x 2.  Received voicemail - no option to leave message     Plan: Care Coordinator will mail unable to contact letter. Care Coordinator will do no further outreaches at this time.    Gerda Genao Care Coordinator RN  [facility]  [phone]  August 28, 2018

## 2018-08-28 NOTE — MR AVS SNAPSHOT
"              After Visit Summary   8/28/2018    Bhavin Awad    MRN: [id number]           Patient Information     Date Of Birth          1960        Visit Information        Provider Department      8/28/2018 10:30 AM Sharon Adame LP [facility]      Today's Diagnoses     Major depressive disorder    -  1    Generalized anxiety disorder           Follow-ups after your visit        Your next 10 appointments already scheduled     Sep 04, 2018  9:00 AM CDT   [facility]    [address] [address] [phone]            Oct 02, 2018 12:30 PM CDT   New Visit with Sharon Adame LP   [facility]    [address] [place] [address] [phone]              Who to contact     If you have questions or need follow up information about today's clinic visit or your schedule please contact [facility] directly at [phone].  Normal or non-critical lab and imaging results will be communicated to you by [employer], letter or phone within 4 business days after the clinic has received the results. If you do not hear from us within 7 days, please contact the clinic through [employer] or phone. If you have a critical or abnormal lab result, we will notify you by phone as soon as possible.  Submit refill requests through [employer] or call your pharmacy and they will forward the refill request to us. Please allow 3 business days for your refill to be completed.          Additional Information About Your Visit        MyChart Information     [employer] lets you send messages to your doctor, view your test results, renew your prescriptions, schedule appointments and more. To sign up, go to [email or web address] . Click on \"Log in\" on the left side of the screen, which will take you to the Welcome " "page. Then click on \"Sign up Now\" on the right side of the page.     You will be asked to enter the access code listed below, as well as some personal information. Please follow the directions to create your username and password.     Your access code is: [email or web address]  Expires: 10/14/2018  7:48 PM     Your access code will  in 90 days. If you need help or a new code, please call your [facility] clinic or [phone].        Care EveryWhere ID     This is your Care EveryWhere ID. This could be used by other organizations to access your [facility] medical records  [id number]         Blood Pressure from Last 3 Encounters:   18 (!) 142/98   18 131/88   08/17/15 118/70    Weight from Last 3 Encounters:   18 83.5 kg (184 lb)   08/17/15 77.6 kg (171 lb)   07/28/15 75.3 kg (166 lb)              Today, you had the following     No orders found for display       Primary Care Provider Fax #    Physician No Ref-Primary [phone]       No address on file        Goals        General    1: Financial Wellbeing (pt-stated)     Notes - Note created  2018 10:52 AM by Iman Dumont LSW    Goal Statement: I will obtain additional financial resources within 1-3 months to support my overall health and to obtain alternative housing.    Measure of Success: I will verbally report my updates to the care coordination team and PCP.   Supportive Steps to Achieve: I will outreach to my financial worker this week and inquire about cash assistance and have this worker send me the necessary forms to obtain additional support.  I will inquire about housing assistance as well when I talk with my financial worker.  I have agreed to outreach to the care coordination team after I talk with my financial worker if questions arise.   Barriers: I am currently residing with my son and may need to leave this residence in the future.    Strengths: I appear motivated to achieve additional financial supports and resources.  "   Date to Achieve By: 9/1/18        2: Transportation (pt-stated)     Notes - Note created  7/26/2018 10:56 AM by Iman Dumont LSW    Goal Statement: I will obtain rides through my insurance in 1-2 weeks for all upcoming medical appointments to ensure I am able to follow up with providers as recommended.    Measure of Success: I will verbally update care team after I talk with insurance or if questions arise.    Supportive Steps to Achieve: [facility] provided me with the [employer] [place] number to call today to line up medical rides. I was educated on this service today and reminded of the timeline to set up rides.  I can outreach to care coordination team if I have questions or concerns regarding transportation.   Barriers: Family rides are becoming difficult to line up, therefore I am looking for additional resources.    Strengths: I appear motivated to attend all of my medical and MH appointments.    Date to Achieve By: 8/15/18          Equal Access to Services     RANDA ORTIZ : Natacha Bradshaw, wadarvin harrington, qajase kaalmarose mary houser, mac villalobos . So [facility] [phone].    ATENCIÓN: Si habla español, tiene a navarrete disposición servicios gratuitos de asistencia lingüística. Llame al [phone].    We comply with applicable federal civil rights laws and [place] laws. We do not discriminate on the basis of race, color, national origin, age, disability, sex, sexual orientation, or gender identity.            Thank you!     Thank you for choosing [facility]  for your care. Our goal is always to provide you with excellent care. Hearing back from our patients is one way we can continue to improve our services. Please take a few minutes to complete the written survey that you may receive in the mail after your visit with us. Thank you!             Your Updated Medication List - Protect others around you: Learn how to safely use, store and throw away  your medicines at www.disposemymeds.org.          This list is accurate as of 8/28/18 11:22 AM.  Always use your most recent med list.                   Brand Name Dispense Instructions for use Diagnosis    ASPIRIN PO      Take 324 mg by mouth every 4 hours as needed for moderate pain

## 2018-08-28 NOTE — PROGRESS NOTES
"                                                                                                                                                                        Adult Intake Structured Interview  Standard Diagnostic Assessment      CLIENT'S NAME: Bhavin Awad  MRN:   [id number]  :   1960  ACCT. NUMBER: [id number]  DATE OF SERVICE: 18      Identifying Information:  Client is a 57 year old, ,  male. Client was referred for counseling by his pcp. Client is currently unemployed. Client attended the session alone.       Client's Statement of Presenting Concern:  Client reports the reason for seeking therapy at this time as \"doctor recommended\" it.  Client stated that his symptoms have resulted in the following functional impairments: management of the household and or completion of tasks, relationship(s), self-care and work / vocational responsibilities      History of Presenting Concern:  Client reports that these problem(s) began over the course of years. Client has attempted to resolve these concerns in the past through counseling. Client reports that other professional(s) are involved in providing support / services:  care coordination .       Social History:  Client reported he grew up in [place], [place]. They were the second born of 3 children. This is an intact family and parents remain  until death. Client reported that his childhood was difficult- related to parents use of alcohol. Client described his current relationships with family of origin as stressful.    Client reported a history of a few committed relationships. Client has been single for 10 years. Client reported having 4 children. Client identified few stable and meaningful social connections. Client reported that he has not been involved with the legal system.  Client's highest education level was some high school but no degree. Client did identify the following learning problems: attention, concentration " and writing. There are no ethnic, cultural or Church factors that may be relevant for therapy. Client identified his preferred language to be English. Client reported he does not need the assistance of an  or other support involved in therapy. Modifications will not be used to assist communication in therapy. Client did not serve in the .     Client reports family history includes C.A.D. in his father and paternal grandfather; Cerebrovascular Disease in his father; Diabetes in his mother.    Mental Health History:  Client reported no family history of mental health issues.  Client previously received the following mental health diagnosis: Depression.  Client has received the following mental health services in the past: counseling and physician / PCP.  Hospitalizations: [facility] . .  Client is not currently receiving any mental health services.      Chemical Health History:  Client reported the following biological family members or relatives with chemical health issues: Father reportedly used alcohol , Mother reportedly used alcohol . Client has received chemical dependency treatment in the past at after a DUI. Client is not currently receiving any chemical dependency treatment. Client reported the following problems as a result of drinking: DUI in the 1970's      Client Reports:  Client reports using alcohol 2 times per year and has 1-6 beers at a time. Client first started drinking at age young age.  Client reports using tobacco 20 times per day. Client started using tobacco at age [age]...  Client reports using marijuana 3 times per month and smokes 1 at a time. Client started using marijuana at age [age].  Client reports using caffeine 2 times per day and drinks 1 at a time. Client started using caffeine at age about 10 years ago..  Client denies using street drugs.  Client denies the non-medical use of prescription or over the counter drugs.    CAGE: None of the patient's  responses to the CAGE screening were positive / Negative CAGE score   Based on the negative Cage-Aid score and clinical interview there  are not indications of drug or alcohol abuse.    Discussed the general effects of drugs and alcohol on health and well-being. Therapist gave client printed information about the effects of chemical use on his health and well being.      Significant Losses / Trauma / Abuse / Neglect Issues:  Loss of health , loss of job  Uncertain about housing - currently living with son    Issues of possible neglect are not present.      Medical Issues:  Client has had a physical exam to rule out medical causes for current symptoms. Date of last physical exam was within the past year. Client was encouraged to follow up with PCP if symptoms were to develop. The client has a [facility] Primary [facility] Provider, who is named Andres Leal. The client reports not having a psychiatrist. Client reports the following current medical concerns: see below. The client reports the presence of chronic or episodic pain in the form of back, knee, shoulder, neck. migarine. The pain level is moderate and has a frequency of ongoing. There are not significant nutritional concerns.     Client reports current meds as:   Current Outpatient Prescriptions   Medication Sig     ASPIRIN PO Take 324 mg by mouth every 4 hours as needed for moderate pain     No current facility-administered medications for this visit.        Client Allergies:  Allergies   Allergen Reactions     No Known Drug Allergies          Medical History:  Past Medical History:   Diagnosis Date     Cervicalgia 7/24/2018     DEPRESS PSYCHOSIS-SEVERE(aka DEPRESSION) 6/16/2004    admitted [facility] in june 2004 , dr gonzales psychiatrist, significant suicidal ideation, and attempts       Homeless 7/24/2018     LBP (low back pain)      Learning disability 7/24/2018     Left knee pain, unspecified chronicity 7/24/2018     Left shoulder pain,  "unspecified chronicity 7/24/2018     Non-cardiac chest pain 7/24/2018     Pulmonary nodules 7/24/2018     Right knee pain, unspecified chronicity 7/24/2018     Rotator cuff disorder          Medication Adherence:  N/A - Client does not have prescribed psychiatric medications.    Client was provided recommendation to follow-up with prescribing physician.    Mental Status Assessment:  Appearance:   Appropriate   Eye Contact:   Good   Psychomotor Behavior: Normal   Attitude:   Cooperative   Orientation:   All  Speech   Rate / Production: Normal    Volume:  Normal   Mood:    Anxious  Normal  Affect:    Appropriate  Worrisome   Thought Content:  Clear   Thought Form:  Coherent  Logical   Insight:    Good       Review of Symptoms:  Depression: Sleep Interest Guilt Energy Concentration Appetite Hopeless Helpless Worthless Irritability  Latosha:  No symptoms  Psychosis: No symptoms  Anxiety: Worries Nervousness  Panic:  Palpitations  Post Traumatic Stress Disorder: Re-experiencing of Trauma Increased Arousal \"its always there in my brain and it doesn't go away\"   Obsessive Compulsive Disorder: needs order  Eating Disorder: No symptoms  Oppositional Defiant Disorder: No symptoms  ADD / ADHD: Attention Listening Task Completion Organization Distractiblity  Conduct Disorder: No symptoms      Safety Assessment:    History of Safety Concerns:   Client reported a history of suicidal ideation.  Onset: during divorce process about 10 years ago . Client identified the following triggers to suicidal ideation: divorce.  Client reported a history of suicide attempt(s): number of previous suicide attempts: client reports that 2001 - when were suicide attempt(s): 2001 (2), methods:  Once tried hanging self, once tried over dose on sell medications , interventions for suicide attempts:  Krystal broke at a park,  Regarding pills- \"I woke up\".  \"got dropped off at the hospital\" stayed for a few days and then checked himself out.  Client denied a " history of homicidal ideation.    Client denied a history of self-injurious ideation and behaviors.    Client denied a history of personal safety concerns.    Client denied a history of assaultive behaviors.        Current Safety Concerns:  Client denies current suicidal ideation.    Client denies current homicidal ideation and behaviors.  Client denies current self-injurious ideation and behaviors.    Client denies current concerns for personal safety.    Client reports the following protective factors: help seeking behaviors when distressed historically, living with other people and daily obligations    Client reports there are no firearms in the house.     Plan for Safety and Risk Management:  A safety and risk management plan has not been developed at this time, however client was given the after-hours number / 911 should there be a change in any of these risk factors.    Client's Strengths and Limitations:  Client identified the following strengths or resources that will help him succeed in counseling: commitment to health and well being and family support. Client identified the following supports: family. Things that may interfere with the client's success in counseling include: financial hardship.      Diagnostic Criteria:   - Restlessness or feeling keyed up or on edge.    - Being easily fatigued.    - Difficulty concentrating or mind going blank.    - Irritability.    - Muscle tension.    - Sleep disturbance (difficulty falling or staying asleep, or restless unsatisfying sleep).    - Depressed mood. Note: In children and adolescents, can be irritable mood.     - Diminished interest or pleasure in all, or almost all, activities.    - Psychomotor activity varies    - Fatigue or loss of energy.    - Feelings of worthlessness .    - Diminished ability to think or concentrate, or indecisiveness.       Functional Status:  Client's symptoms have caused and are causing reduced functional status in the following  areas: management of the household and or completion of tasks, relationship(s), self-care and work / vocational responsibilities        DSM5 Diagnoses: (Sustained by DSM5 Criteria Listed Above)  Diagnoses:   JOHNNIE  Past dx of MDD.   R/O PTSD  Psychosocial & Contextual Factors: physical health concerns  WHODAS 2.0 (12 item)            This questionnaire asks about difficulties due to health conditions. Health conditions  include  disease or illnesses, other health problems that may be short or long lasting,  injuries, mental health or emotional problems, and problems with alcohol or drugs.                     Think back over the past 30 days and answer these questions, thinking about how much  difficulty you had doing the following activities. For each question, please [place] only  one response.    S1 Standing for long periods such as 30 minutes? Severe =       4   S2 Taking care of household responsibilities? Moderate =   3   S3 Learning a new task, for example, learning how to get to a new place? Severe =       4   S4 How much of a problem do you have joining community activities (for example, festivals, Druze or other activities) in the same way as anyone else can? Extreme / or cannot do = 5   S5 How much have you been emotionally affected by your health problems? Severe =       4     In the past 30 days, how much difficulty did you have in:   S6 Concentrating on doing something for ten minutes? Moderate =   3   S7 Walking a long distance such as a kilometer (or equivalent)? Severe =       4   S8 Washing your whole body? Mild =           2   S9 Getting dressed? Mild =           2   S10 Dealing with people you do not know? Severe =       4   S11 Maintaining a friendship? Mild =           2   S12 Your day to day work? Moderate =   3     H1 Overall, in the past 30 days, how many days were these difficulties present? Record number of days 30   H2 In the past 30 days, for how many days were you totally unable to  carry out your usual activities or work because of any health condition? Record number of days  none   H3 In the past 30 days, not counting the days that you were totally unable, for how many days did you cut back or reduce your usual activities or work because of any health condition? Record number of days 3     Attendance Agreement:  Client has signed Attendance Agreement:Yes      Collaboration:  Collaboration with other professionals is not indicated at this time.      Preliminary Treatment Plan:  The client reports no currently identified Adventism, ethnic or cultural issues relevant to therapy.     services are not indicated.    Modifications to assist communication are not indicated.    The concerns identified by the client will be addressed in therapy.    Initial Treatment will focus on: Depressed Mood - .  Grief / Loss - ..    As a preliminary treatment goal, client will experience a reduction in depressed mood, will develop more effective coping skills to manage depressive symptoms, will develop healthy cognitive patterns and beliefs, will increase ability to function adaptively and will continue to take medications as prescribed / participate in supportive activities and services  and will experience a reduction in anxiety, will develop more effective coping skills to manage anxiety symptoms, will develop healthy cognitive patterns and beliefs and will increase ability to function adaptively.    The focus of initial interventions will be to alleviate anxiety, alleviate depressed mood, increase ability to function adaptively, increase coping skills, teach CBT skills, teach problem-solving skills and teach stress mangement techniques.    The following referral(s) will be initiated: care coordination.    A Release of Information is not needed at this time.    Report to child / adult protection services was NA.    Client will have access to their [facility] medical record.    Sharon  MONA Adame  August 28, 2018

## 2018-08-29 ASSESSMENT — ANXIETY QUESTIONNAIRES: GAD7 TOTAL SCORE: 19

## 2018-08-29 ASSESSMENT — PATIENT HEALTH QUESTIONNAIRE - PHQ9: SUM OF ALL RESPONSES TO PHQ QUESTIONS 1-9: 14

## 2018-10-02 ENCOUNTER — RADIANT APPOINTMENT (OUTPATIENT)
Dept: GENERAL RADIOLOGY | Facility: CLINIC | Age: 58
End: 2018-10-02
Attending: FAMILY MEDICINE
Payer: COMMERCIAL

## 2018-10-02 ENCOUNTER — OFFICE VISIT (OUTPATIENT)
Dept: PSYCHOLOGY | Facility: CLINIC | Age: 58
End: 2018-10-02
Payer: COMMERCIAL

## 2018-10-02 ENCOUNTER — OFFICE VISIT (OUTPATIENT)
Dept: FAMILY MEDICINE | Facility: CLINIC | Age: 58
End: 2018-10-02
Payer: COMMERCIAL

## 2018-10-02 VITALS
HEART RATE: 76 BPM | RESPIRATION RATE: 16 BRPM | WEIGHT: 184 LBS | HEIGHT: 69 IN | BODY MASS INDEX: 27.25 KG/M2 | DIASTOLIC BLOOD PRESSURE: 90 MMHG | TEMPERATURE: 98.7 F | SYSTOLIC BLOOD PRESSURE: 150 MMHG

## 2018-10-02 DIAGNOSIS — M25.561 RIGHT KNEE PAIN, UNSPECIFIED CHRONICITY: ICD-10-CM

## 2018-10-02 DIAGNOSIS — F41.1 GENERALIZED ANXIETY DISORDER: Primary | ICD-10-CM

## 2018-10-02 DIAGNOSIS — I10 ESSENTIAL HYPERTENSION: ICD-10-CM

## 2018-10-02 DIAGNOSIS — F41.1 GENERALIZED ANXIETY DISORDER: ICD-10-CM

## 2018-10-02 DIAGNOSIS — M54.50 MIDLINE LOW BACK PAIN WITHOUT SCIATICA, UNSPECIFIED CHRONICITY: ICD-10-CM

## 2018-10-02 DIAGNOSIS — M75.102 ROTATOR CUFF SYNDROME, LEFT: ICD-10-CM

## 2018-10-02 DIAGNOSIS — M54.2 CERVICALGIA: ICD-10-CM

## 2018-10-02 DIAGNOSIS — Z59.00 HOMELESS: ICD-10-CM

## 2018-10-02 DIAGNOSIS — F32.9 MAJOR DEPRESSIVE DISORDER: ICD-10-CM

## 2018-10-02 DIAGNOSIS — M25.562 LEFT KNEE PAIN, UNSPECIFIED CHRONICITY: Primary | ICD-10-CM

## 2018-10-02 DIAGNOSIS — M25.562 LEFT KNEE PAIN, UNSPECIFIED CHRONICITY: ICD-10-CM

## 2018-10-02 LAB — ERYTHROCYTE [SEDIMENTATION RATE] IN BLOOD BY WESTERGREN METHOD: 5 MM/H (ref 0–20)

## 2018-10-02 PROCEDURE — 90834 PSYTX W PT 45 MINUTES: CPT | Performed by: PSYCHOLOGIST

## 2018-10-02 PROCEDURE — 84550 ASSAY OF BLOOD/URIC ACID: CPT | Performed by: FAMILY MEDICINE

## 2018-10-02 PROCEDURE — 36415 COLL VENOUS BLD VENIPUNCTURE: CPT | Performed by: FAMILY MEDICINE

## 2018-10-02 PROCEDURE — 72100 X-RAY EXAM L-S SPINE 2/3 VWS: CPT

## 2018-10-02 PROCEDURE — 73560 X-RAY EXAM OF KNEE 1 OR 2: CPT | Mod: RT

## 2018-10-02 PROCEDURE — 99214 OFFICE O/P EST MOD 30 MIN: CPT | Performed by: FAMILY MEDICINE

## 2018-10-02 PROCEDURE — 72040 X-RAY EXAM NECK SPINE 2-3 VW: CPT

## 2018-10-02 PROCEDURE — 86038 ANTINUCLEAR ANTIBODIES: CPT | Performed by: FAMILY MEDICINE

## 2018-10-02 PROCEDURE — 86431 RHEUMATOID FACTOR QUANT: CPT | Performed by: FAMILY MEDICINE

## 2018-10-02 PROCEDURE — 85652 RBC SED RATE AUTOMATED: CPT | Performed by: FAMILY MEDICINE

## 2018-10-02 PROCEDURE — 73562 X-RAY EXAM OF KNEE 3: CPT | Mod: LT

## 2018-10-02 RX ORDER — DESVENLAFAXINE 25 MG/1
25 TABLET, EXTENDED RELEASE ORAL DAILY
Qty: 30 TABLET | Refills: 1 | Status: SHIPPED | OUTPATIENT
Start: 2018-10-02 | End: 2018-10-04

## 2018-10-02 SDOH — ECONOMIC STABILITY - HOUSING INSECURITY: HOMELESSNESS UNSPECIFIED: Z59.00

## 2018-10-02 ASSESSMENT — PATIENT HEALTH QUESTIONNAIRE - PHQ9: 5. POOR APPETITE OR OVEREATING: NEARLY EVERY DAY

## 2018-10-02 ASSESSMENT — ANXIETY QUESTIONNAIRES
6. BECOMING EASILY ANNOYED OR IRRITABLE: NEARLY EVERY DAY
7. FEELING AFRAID AS IF SOMETHING AWFUL MIGHT HAPPEN: NEARLY EVERY DAY
2. NOT BEING ABLE TO STOP OR CONTROL WORRYING: NEARLY EVERY DAY
1. FEELING NERVOUS, ANXIOUS, OR ON EDGE: NEARLY EVERY DAY
3. WORRYING TOO MUCH ABOUT DIFFERENT THINGS: NEARLY EVERY DAY
5. BEING SO RESTLESS THAT IT IS HARD TO SIT STILL: NEARLY EVERY DAY
GAD7 TOTAL SCORE: 21

## 2018-10-02 NOTE — MR AVS SNAPSHOT
After Visit Summary   10/2/2018    Bhavin Awad    MRN: [id number]           Patient Information     Date Of Birth          1960        Visit Information        Provider Department      10/2/2018 2:15 PM Andres Leal MD [facility]        Today's Diagnoses     Left knee pain, unspecified chronicity    -  1    Right knee pain, unspecified chronicity        Rotator cuff syndrome, left        Cervicalgia        Homeless        Midline low back pain without sciatica, unspecified chronicity        Essential hypertension        Generalized anxiety disorder           Follow-ups after your visit        Follow-up notes from your care team     Return in about 4 weeks (around 10/30/2018).      Your next 10 appointments already scheduled     Oct 11, 2018  1:00 PM CDT   Return Visit with Sharon Adame LP   [facility] ([facility])    [address] [place] [address] [phone]            Oct 24, 2018  1:30 PM CDT   SHORT with Andres Leal MD   [facility] ([address] [address] [phone]            Oct 25, 2018  1:00 PM CDT   Return Visit with Sharon Adame LP   [facility] ([address] [address] [phone]              Who to contact     If you have questions or need follow up information about today's clinic visit or your schedule please contact [facility] directly at [phone].  Normal or non-critical lab and imaging results will be communicated to you by MyChart, letter or phone within 4 business days after the clinic has received the results. If you do not hear from us within 7 days, please contact the clinic through MyChart or phone. If you have a critical or abnormal lab result, we will notify you by phone as soon as possible.  Submit refill  "requests through [employer] or call your pharmacy and they will forward the refill request to us. Please allow 3 business days for your refill to be completed.          Additional Information About Your Visit        [employer] Information     [employer] lets you send messages to your doctor, view your test results, renew your prescriptions, schedule appointments and more. To sign up, go to [email or web address] . Click on \"Log in\" on the left side of the screen, which will take you to the Welcome page. Then click on \"Sign up Now\" on the right side of the page.     You will be asked to enter the access code listed below, as well as some personal information. Please follow the directions to create your username and password.     Your access code is: [email or web address]  Expires: 10/14/2018  7:48 PM     Your access code will  in 90 days. If you need help or a new code, please call your [place] clinic or [phone].        Care EveryWhere ID     This is your Care EveryWhere ID. This could be used by other organizations to access your [place] medical records  [id number]        Your Vitals Were     Pulse Temperature Respirations Height BMI (Body Mass Index)       76 98.7  F (37.1  C) (Oral) 16 5' 9\" (1.753 m) 27.17 kg/m2        Blood Pressure from Last 3 Encounters:   10/02/18 150/90   18 (!) 142/98   18 131/88    Weight from Last 3 Encounters:   10/02/18 184 lb (83.5 kg)   18 184 lb (83.5 kg)   08/17/15 171 lb (77.6 kg)              We Performed the Following     Anti Nuclear Esther IgG by IFA with Reflex     ESR: Erythrocyte sedimentation rate     Rheumatoid factor     Uric acid          Today's Medication Changes          These changes are accurate as of 10/2/18  9:06 PM.  If you have any questions, ask your nurse or doctor.               Start taking these medicines.        Dose/Directions    desvenlafaxine succinate 25 MG 24 hr tablet   Commonly known as:  PRISTIQ   Used for:  Left knee pain, unspecified " chronicity, Right knee pain, unspecified chronicity, Cervicalgia, Generalized anxiety disorder   Started by:  Andres Leal MD        Dose:  25 mg   Take 1 tablet (25 mg) by mouth daily   Quantity:  30 tablet   Refills:  1            Where to get your medicines      These medications were sent to [facility] [place], [place] - [address]  [address] [facility] [address]     Phone:  [phone]     desvenlafaxine succinate 25 MG 24 hr tablet                Primary Care Provider Fax #    Physician No Ref-Primary [phone]       No address on file        Goals        General    1: Financial Wellbeing (pt-stated)     Notes - Note created  7/26/2018 10:52 AM by Iman Dumont LSW    Goal Statement: I will obtain additional financial resources within 1-3 months to support my overall health and to obtain alternative housing.    Measure of Success: I will verbally report my updates to the care coordination team and PCP.   Supportive Steps to Achieve: I will outreach to my financial worker this week and inquire about cash assistance and have this worker send me the necessary forms to obtain additional support.  I will inquire about housing assistance as well when I talk with my financial worker.  I have agreed to outreach to the care coordination team after I talk with my financial worker if questions arise.   Barriers: I am currently residing with my son and may need to leave this residence in the future.    Strengths: I appear motivated to achieve additional financial supports and resources.    Date to Achieve By: 9/1/18        2: Transportation (pt-stated)     Notes - Note created  7/26/2018 10:56 AM by Iman Dumont LSW    Goal Statement: I will obtain rides through my insurance in 1-2 weeks for all upcoming medical appointments to ensure I am able to follow up with providers as recommended.    Measure of Success: I will verbally update care team after I talk with insurance or  if questions arise.    Supportive Steps to Achieve: [facility] provided me with the [employer] [place] number to call today to line up medical rides. I was educated on this service today and reminded of the timeline to set up rides.  I can outreach to care coordination team if I have questions or concerns regarding transportation.   Barriers: Family rides are becoming difficult to line up, therefore I am looking for additional resources.    Strengths: I appear motivated to attend all of my medical and MH appointments.    Date to Achieve By: 8/15/18          Equal Access to Services     RANDA ORTIZ : Hadii renato ku hadasho Soomaali, waaxda luqadaha, qaybta kaalmada adeegyada, waxdavid restrepo. So [facility] [phone].    ATENCIÓN: Si habla español, tiene a navarrete disposición servicios gratuitos de asistencia lingüística. [facility] [phone].    We comply with applicable federal civil rights laws and [place] laws. We do not discriminate on the basis of race, color, national origin, age, disability, sex, sexual orientation, or gender identity.            Thank you!     Thank you for choosing [facility]  for your care. Our goal is always to provide you with excellent care. Hearing back from our patients is one way we can continue to improve our services. Please take a few minutes to complete the written survey that you may receive in the mail after your visit with us. Thank you!             Your Updated Medication List - Protect others around you: Learn how to safely use, store and throw away your medicines at www.disposemymeds.org.          This list is accurate as of 10/2/18  9:06 PM.  Always use your most recent med list.                   Brand Name Dispense Instructions for use Diagnosis    desvenlafaxine succinate 25 MG 24 hr tablet    PRISTIQ    30 tablet    Take 1 tablet (25 mg) by mouth daily    Left knee pain, unspecified chronicity, Right knee pain, unspecified chronicity, Cervicalgia,  Generalized anxiety disorder       IBUPROFEN PO      Take 200 mg by mouth every 4 hours as needed for moderate pain (Over 1200mg a day)

## 2018-10-02 NOTE — PATIENT INSTRUCTIONS
"                                             Bhavin Awad     SAFETY PLAN:  Step 1: Warning signs / cues (Thoughts, images, mood, situation, behavior) that a crisis may be developing:    Thoughts: \"dont know wha tto do\"    Images: none    Thinking Processes: depersonalization    Mood: worsening depression    Behaviors: not taking care of myself    Situations: discourse with family   Step 2: Coping strategies - Things I can do to take my mind off of my problems without contacting another person (relaxation technique, physical activity):    Distress Tolerance Strategies:  self -reliance    Physical Activities: deep breathing    Focus on helpful thoughts:  \"I will get through this\". Self reliance  Step 3: People and social settings that provide distraction:   Name: roscoe  Phone:  [phone]       none   Step 4: Remind myself of people and things that are important to me and worth living for:  Grandchildren, son      Step 5: When I am in crisis, I can ask these people to help me use my safety plan:   Name: roscoe  Phone:  [phone]  Step 6: Making the environment safe:     be around others and homeless shelter  Step 7: Professionals or agencies I can contact during a crisis:    [facility] Daytime and After Hours Crisis Number: [phone]    Suicide Prevention Lifeline: 3-639-002-TALK 8277)    Crisis Text Line Service (available 24 hours a day, 7 days a week): Text [place] to 889834  Local Crisis Services: .      Call 911 or go to my nearest emergency department.   I helped develop this safety plan and agree to use it when needed.  I have been given a copy of this plan.      Client signature _________________________________________________________________  Today s date:  10/2/2018  Adapted from Safety Plan Template 2008 Concha Sher and Sanjay Astorga is reprinted with the express permission of the authors.  No portion of the Safety Plan Template may be reproduced without the express, written " permission.  You can contact the authors at [email or web address] or [email or web address].
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
## 2018-10-02 NOTE — LETTER
October 4, 2018      Bhavin Awad  [address] [address] [facility] [address]        Dear ,    We are writing to inform you of your test results.    Tests are all normal. No connective tissue illness. Looks like stress     Resulted Orders   Anti Nuclear Esther IgG by IFA with Reflex   Result Value Ref Range    LUIS FERNANDO interpretation Negative NEG^Negative      Comment:                                         Reference range:  <1:40  NEGATIVE  1:40 - 1:80  BORDERLINE POSITIVE  >1:80 POSITIVE     Rheumatoid factor   Result Value Ref Range    Rheumatoid Factor <20 <20 IU/mL   ESR: Erythrocyte sedimentation rate   Result Value Ref Range    Sed Rate 5 0 - 20 mm/h   Uric acid   Result Value Ref Range    Uric Acid 4.6 3.5 - 7.2 mg/dL       If you have any questions or concerns, please call the clinic at the number listed above.       Sincerely,        Andres Leal MD/ian

## 2018-10-02 NOTE — MR AVS SNAPSHOT
"              After Visit Summary   10/2/2018    Bhavin Awad    MRN: [id number]           Patient Information     Date Of Birth          1960        Visit Information        Provider Department      10/2/2018 12:30 PM Sharon Adame LP [facility] Generic      Today's Diagnoses     Generalized anxiety disorder    -  1    Major depressive disorder          Care Instructions                                                 Bhavin Awad     SAFETY PLAN:  Step 1: Warning signs / cues (Thoughts, images, mood, situation, behavior) that a crisis may be developing:    Thoughts: \"dont know wha tto do\"    Images: none    Thinking Processes: depersonalization    Mood: worsening depression    Behaviors: not taking care of myself    Situations: discourse with family   Step 2: Coping strategies - Things I can do to take my mind off of my problems without contacting another person (relaxation technique, physical activity):    Distress Tolerance Strategies:  self -reliance    Physical Activities: deep breathing    Focus on helpful thoughts:  \"I will get through this\". Self reliance  Step 3: People and social settings that provide distraction:   Name: son  Phone:  [phone]       none   Step 4: Remind myself of people and things that are important to me and worth living for:  Grandchildren, son      Step 5: When I am in crisis, I can ask these people to help me use my safety plan:   Name: son  Phone:  [phone]  Step 6: Making the environment safe:     be around others and homeless shelter  Step 7: Professionals or agencies I can contact during a crisis:    [facility] Daytime and After Hours Crisis Number: 753-189-0450    Suicide Prevention Lifeline: 6-036-149-TALK (7253)    Crisis Text Line Service (available 24 hours a day, 7 days a week): Text [place] to 573287  Local Crisis Services: .      Call 911 or go to my nearest emergency department.   I helped develop this " safety plan and agree to use it when needed.  I have been given a copy of this plan.      Client signature _________________________________________________________________  Today s date:  10/2/2018  Adapted from Safety Plan Template 2008 Concha Sher and Sanjay Astorga is reprinted with the express permission of the authors.  No portion of the Safety Plan Template may be reproduced without the express, written permission.  You can contact the authors at [email or web address] or [email or web address].                Follow-ups after your visit        Your next 10 appointments already scheduled     Oct 02, 2018  2:15 PM CDT   SHORT with Andres Leal MD   [address] [address] [phone]            Oct 11, 2018  1:00 PM CDT   Return Visit with Sharon Adame LP   [address] [address] [phone]            Oct 25, 2018  1:00 PM CDT   Return Visit with Sharon Adame LP   [address] [address] [phone]              Who to contact     If you have questions or need follow up information about today's clinic visit or your schedule please contact [facility] directly at [phone].  Normal or non-critical lab and imaging results will be communicated to you by MyChart, letter or phone within 4 business days after the clinic has received the results. If you do not hear from us within 7 days, please contact the clinic through MyChart or phone. If you have a critical or abnormal lab result, we will notify you by phone as soon as possible.  Submit refill requests through [facility] or call your pharmacy and they will forward the refill request to us. Please allow 3 business days for your refill to be completed.           "Additional Information About Your Visit        MyChart Information     fotopedia lets you send messages to your doctor, view your test results, renew your prescriptions, schedule appointments and more. To sign up, go to [email or web address] . Click on \"Log in\" on the left side of the screen, which will take you to the Welcome page. Then click on \"Sign up Now\" on the right side of the page.     You will be asked to enter the access code listed below, as well as some personal information. Please follow the directions to create your username and password.     Your access code is: [email or web address]  Expires: 10/14/2018  7:48 PM     Your access code will  in 90 days. If you need help or a new code, please call your [facility] clinic or [phone].        Care EveryWhere ID     This is your Care EveryWhere ID. This could be used by other organizations to access your [facility] medical records  [id number]         Blood Pressure from Last 3 Encounters:   18 (!) 142/98   18 131/88   08/17/15 118/70    Weight from Last 3 Encounters:   18 83.5 kg (184 lb)   08/17/15 77.6 kg (171 lb)   07/28/15 75.3 kg (166 lb)              Today, you had the following     No orders found for display       Primary Care Provider Fax #    Physician No Ref-Primary [phone]       No address on file        Goals        General    1: Financial Wellbeing (pt-stated)     Notes - Note created  2018 10:52 AM by Iman Dumont LSW    Goal Statement: I will obtain additional financial resources within 1-3 months to support my overall health and to obtain alternative housing.    Measure of Success: I will verbally report my updates to the care coordination team and PCP.   Supportive Steps to Achieve: I will outreach to my financial worker this week and inquire about cash assistance and have this worker send me the necessary forms to obtain additional support.  I will inquire about housing assistance as well when I talk with " my financial worker.  I have agreed to outreach to the care coordination team after I talk with my financial worker if questions arise.   Barriers: I am currently residing with my son and may need to leave this residence in the future.    Strengths: I appear motivated to achieve additional financial supports and resources.    Date to Achieve By: 9/1/18        2: Transportation (pt-stated)     Notes - Note created  7/26/2018 10:56 AM by Iman Dumont LSW    Goal Statement: I will obtain rides through my insurance in 1-2 weeks for all upcoming medical appointments to ensure I am able to follow up with providers as recommended.    Measure of Success: I will verbally update care team after I talk with insurance or if questions arise.    Supportive Steps to Achieve: [facility] provided me with the [facility] number to call today to line up medical rides. I was educated on this service today and reminded of the timeline to set up rides.  I can outreach to care coordination team if I have questions or concerns regarding transportation.   Barriers: Family rides are becoming difficult to line up, therefore I am looking for additional resources.    Strengths: I appear motivated to attend all of my medical and MH appointments.    Date to Achieve By: 8/15/18          Equal Access to Services     RANDA ORTIZ AH: Natacha Bradshaw, mary harrington, qajase houser, mac restrepo. So [facility] [phone].    ATENCIÓN: Si habla español, tiene a navarrete disposición servicios gratuitos de asistencia lingüística. Llame al [phone].    We comply with applicable federal civil rights laws and [place] laws. We do not discriminate on the basis of race, color, national origin, age, disability, sex, sexual orientation, or gender identity.            Thank you!     Thank you for choosing [facility]  for your care. Our goal is always to provide you with excellent care.  Hearing back from our patients is one way we can continue to improve our services. Please take a few minutes to complete the written survey that you may receive in the mail after your visit with us. Thank you!             Your Updated Medication List - Protect others around you: Learn how to safely use, store and throw away your medicines at www.disposemymeds.org.          This list is accurate as of 10/2/18  1:39 PM.  Always use your most recent med list.                   Brand Name Dispense Instructions for use Diagnosis    ASPIRIN PO      Take 324 mg by mouth every 4 hours as needed for moderate pain
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
## 2018-10-03 ENCOUNTER — TELEPHONE (OUTPATIENT)
Dept: FAMILY MEDICINE | Facility: CLINIC | Age: 58
End: 2018-10-03

## 2018-10-03 DIAGNOSIS — F33.9 RECURRENT MAJOR DEPRESSIVE DISORDER, REMISSION STATUS UNSPECIFIED (H): Primary | ICD-10-CM

## 2018-10-03 DIAGNOSIS — F41.1 GENERALIZED ANXIETY DISORDER: ICD-10-CM

## 2018-10-03 LAB
ANA SER QL IF: NEGATIVE
RHEUMATOID FACT SER NEPH-ACNC: <20 IU/ML (ref 0–20)
URATE SERPL-MCNC: 4.6 MG/DL (ref 3.5–7.2)

## 2018-10-03 ASSESSMENT — PATIENT HEALTH QUESTIONNAIRE - PHQ9: SUM OF ALL RESPONSES TO PHQ QUESTIONS 1-9: 18

## 2018-10-03 ASSESSMENT — ANXIETY QUESTIONNAIRES: GAD7 TOTAL SCORE: 21

## 2018-10-03 NOTE — TELEPHONE ENCOUNTER
The Pristiq 25mg prescribed yesterday 10/2/18 was not covered; we discussed and ran a test claim for generic Wellbutrin XR 150mg.  Would you like to send over a new rx for this?  Thanks!  Barbara Barron, Pharmacy [facility]  [phone]

## 2018-10-04 RX ORDER — BUPROPION HYDROCHLORIDE 150 MG/1
150 TABLET ORAL EVERY MORNING
Qty: 30 TABLET | Refills: 1 | Status: SHIPPED | OUTPATIENT
Start: 2018-10-04 | End: 2018-10-24

## 2018-10-11 ENCOUNTER — OFFICE VISIT (OUTPATIENT)
Dept: PSYCHOLOGY | Facility: CLINIC | Age: 58
End: 2018-10-11
Payer: COMMERCIAL

## 2018-10-11 DIAGNOSIS — F33.9 RECURRENT MAJOR DEPRESSIVE DISORDER, REMISSION STATUS UNSPECIFIED (H): ICD-10-CM

## 2018-10-11 DIAGNOSIS — F41.1 GENERALIZED ANXIETY DISORDER: Primary | ICD-10-CM

## 2018-10-11 DIAGNOSIS — F17.200 TOBACCO USE DISORDER: ICD-10-CM

## 2018-10-11 PROCEDURE — 90834 PSYTX W PT 45 MINUTES: CPT | Performed by: PSYCHOLOGIST
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
## 2018-10-11 NOTE — MR AVS SNAPSHOT
"                  MRN:[id number]                      After Visit Summary   10/11/2018    Bhavin Awad    MRN: [id number]           Visit Information        Provider Department      10/11/2018 1:00 PM Sharon Adame LP [facility] Generic      Your next 10 appointments already scheduled     Oct 24, 2018  1:30 PM CDT   SHORT with Andres Leal MD   [facility]    [address] [place] [id number] [phone]            Oct 25, 2018  1:00 PM CDT   Return Visit with Sharon Adame LP   [facility] ([facility])    [address] [place] [id number] [phone]            2018  2:00 PM CST   Return Visit with Sharon Adame LP   [facility] ([facility])    [address] [place] [id number] [phone]              MyChart Information     [facility] lets you send messages to your doctor, view your test results, renew your prescriptions, schedule appointments and more. To sign up, go to [email or web address] . Click on \"Log in\" on the left side of the screen, which will take you to the Welcome page. Then click on \"Sign up Now\" on the right side of the page.     You will be asked to enter the access code listed below, as well as some personal information. Please follow the directions to create your username and password.     Your access code is: [email or web address]  Expires: 10/14/2018  7:48 PM     Your access code will  in 90 days. If you need help or a new code, please call your [place] clinic or [phone].        Care EveryWhere ID     This is your Care EveryWhere ID. This could be used by other organizations to access your [place] medical records  [id number]        Equal Access to Services     RANDA ORTIZ AH: Natacha Bradshaw, mary harrington, marcelino houser, mac arrington " gt villalobos ah. So [facility] [phone].    ATENCIÓN: Si habla español, tiene a navarrete disposición servicios gratuitos de asistencia lingüística. Llame al [phone].    We comply with applicable federal civil rights laws and [place] laws. We do not discriminate on the basis of race, color, national origin, age, disability, sex, sexual orientation, or gender identity.

## 2018-10-24 ENCOUNTER — OFFICE VISIT (OUTPATIENT)
Dept: FAMILY MEDICINE | Facility: CLINIC | Age: 58
End: 2018-10-24
Payer: COMMERCIAL

## 2018-10-24 VITALS
TEMPERATURE: 98.3 F | HEIGHT: 69 IN | WEIGHT: 179 LBS | SYSTOLIC BLOOD PRESSURE: 133 MMHG | HEART RATE: 71 BPM | DIASTOLIC BLOOD PRESSURE: 84 MMHG | RESPIRATION RATE: 16 BRPM | BODY MASS INDEX: 26.51 KG/M2

## 2018-10-24 DIAGNOSIS — F33.9 RECURRENT MAJOR DEPRESSIVE DISORDER, REMISSION STATUS UNSPECIFIED (H): ICD-10-CM

## 2018-10-24 DIAGNOSIS — M25.561 RIGHT KNEE PAIN, UNSPECIFIED CHRONICITY: ICD-10-CM

## 2018-10-24 DIAGNOSIS — M25.562 LEFT KNEE PAIN, UNSPECIFIED CHRONICITY: ICD-10-CM

## 2018-10-24 DIAGNOSIS — Z28.21 INFLUENZA VACCINATION DECLINED BY PATIENT: ICD-10-CM

## 2018-10-24 DIAGNOSIS — Z59.00 HOMELESS: Primary | ICD-10-CM

## 2018-10-24 DIAGNOSIS — F41.1 GENERALIZED ANXIETY DISORDER: ICD-10-CM

## 2018-10-24 DIAGNOSIS — I10 ESSENTIAL HYPERTENSION: ICD-10-CM

## 2018-10-24 DIAGNOSIS — M54.2 CERVICALGIA: ICD-10-CM

## 2018-10-24 PROCEDURE — 99214 OFFICE O/P EST MOD 30 MIN: CPT | Performed by: FAMILY MEDICINE

## 2018-10-24 RX ORDER — BUPROPION HYDROCHLORIDE 300 MG/1
300 TABLET ORAL EVERY MORNING
Qty: 30 TABLET | Refills: 1 | Status: SHIPPED | OUTPATIENT
Start: 2018-10-24 | End: 2018-11-19

## 2018-10-24 SDOH — ECONOMIC STABILITY - HOUSING INSECURITY: HOMELESSNESS UNSPECIFIED: Z59.00

## 2018-10-24 NOTE — MR AVS SNAPSHOT
After Visit Summary   10/24/2018    Bhavin Awda    MRN: [id number]           Patient Information     Date Of Birth          1960        Visit Information        Provider Department      10/24/2018 1:30 PM Andres Leal MD [facility]        Today's Diagnoses     Homeless    -  1    Recurrent major depressive disorder, remission status unspecified (H)        Generalized anxiety disorder        Influenza vaccination declined by patient        Cervicalgia        Essential hypertension        Right knee pain, unspecified chronicity        Left knee pain, unspecified chronicity           Follow-ups after your visit        Additional Services     CARE COORDINATION REFERRAL       Services are provided by a Care Coordinator for people with complex needs such as: medical, social, or financial troubles.  The Care Coordinator works with the patient and their Primary Care Provider to determine health goals, obtain resources, achieve outcomes, and develop care plans that help coordinate the patient's care.     Reason for Referral: Financial Support: Housing    Additional pertinent details:  Learning disability    Clinical Staff have discussed the Care Coordination Referral with the patient and/or caregiver: yes                  Follow-up notes from your care team     Return in about 4 weeks (around 11/21/2018).      Your next 10 appointments already scheduled     Nov 08, 2018  2:00 PM CST   Return Visit with Sharon Adame LP   [facility] ([facility]    [address] [place] [address] [phone]            Nov 19, 2018  1:00 PM CST   SHORT with Andres Leal MD   [facility] ([facility])    [address] [facility] [address]   [phone]            Dec 06, 2018  2:00 PM CST   Return Visit with Sharon Adame LP   [facility] ([facility])    [address]  [facility] [id number] [phone]              Who to contact     If you have questions or need follow up information about today's clinic visit or your schedule please contact [facility] directly at [phone].  Normal or non-critical lab and imaging results will be communicated to you by MyChart, letter or phone within 4 business days after the clinic has received the results. If you do not hear from us within 7 days, please contact the clinic through MyChart or phone. If you have a critical or abnormal lab result, we will notify you by phone as soon as possible.  Submit refill requests through [employer] or call your pharmacy and they will forward the refill request to us. Please allow 3 business days for your refill to be completed.          Additional Information About Your Visit        Care EveryWhere ID     This is your Care EveryWhere ID. This could be used by other organizations to access your [facility] medical records  [id number]        Your Vitals Were     Pulse Temperature Respirations Height BMI (Body Mass Index)       71 98.3  F (36.8  C) (Oral) 16 5' 9\" (1.753 m) 26.43 kg/m2        Blood Pressure from Last 3 Encounters:   10/24/18 133/84   10/02/18 150/90   07/24/18 (!) 142/98    Weight from Last 3 Encounters:   10/24/18 179 lb (81.2 kg)   10/02/18 184 lb (83.5 kg)   07/24/18 184 lb (83.5 kg)              We Performed the Following     CARE COORDINATION REFERRAL          Today's Medication Changes          These changes are accurate as of 10/24/18 11:59 PM.  If you have any questions, ask your nurse or doctor.               Start taking these medicines.        Dose/Directions    nabumetone 750 MG tablet   Commonly known as:  RELAFEN   Used for:  Cervicalgia, Left knee pain, unspecified chronicity, Right knee pain, unspecified chronicity   Started by:  Andres Leal MD        Dose:  750 mg   Take 1 tablet (750 mg) by mouth daily   Quantity:  30 tablet   Refills:  " 1         These medicines have changed or have updated prescriptions.        Dose/Directions    buPROPion 300 MG 24 hr tablet   Commonly known as:  WELLBUTRIN XL   This may have changed:    - medication strength  - how much to take   Used for:  Recurrent major depressive disorder, remission status unspecified (H), Generalized anxiety disorder   Changed by:  Andres Leal MD        Dose:  300 mg   Take 1 tablet (300 mg) by mouth every morning   Quantity:  30 tablet   Refills:  1            Where to get your medicines      These medications were sent to [facility] [id number] [place] [address]  [address] [facility] [address]     Phone:  [phone]     buPROPion 300 MG 24 hr tablet    nabumetone 750 MG tablet                Primary Care Provider Office Phone # Fax #    Andres Leal [phone] [address] [place] [address]        Goals        General    1: Financial Wellbeing (pt-stated)     Notes - Note created  7/26/2018 10:52 AM by Iman Dumont LSW    Goal Statement: I will obtain additional financial resources within 1-3 months to support my overall health and to obtain alternative housing.    Measure of Success: I will verbally report my updates to the care coordination team and PCP.   Supportive Steps to Achieve: I will outreach to my financial worker this week and inquire about cash assistance and have this worker send me the necessary forms to obtain additional support.  I will inquire about housing assistance as well when I talk with my financial worker.  I have agreed to outreach to the care coordination team after I talk with my financial worker if questions arise.   Barriers: I am currently residing with my son and may need to leave this residence in the future.    Strengths: I appear motivated to achieve additional financial supports and resources.    Date to Achieve By: 9/1/18        2: Transportation (pt-stated)     Notes - Note  created  7/26/2018 10:56 AM by Iman Dumont LSW    Goal Statement: I will obtain rides through my insurance in 1-2 weeks for all upcoming medical appointments to ensure I am able to follow up with providers as recommended.    Measure of Success: I will verbally update care team after I talk with insurance or if questions arise.    Supportive Steps to Achieve: [facility] provided me with the [employer] number to call today to line up medical rides. I was educated on this service today and reminded of the timeline to set up rides.  I can outreach to care coordination team if I have questions or concerns regarding transportation.   Barriers: Family rides are becoming difficult to line up, therefore I am looking for additional resources.    Strengths: I appear motivated to attend all of my medical and MH appointments.    Date to Achieve By: 8/15/18          Equal Access to Services     RANDA ORTIZ : Natacha Bradshaw, mary harrington, marcelino alexandrealregan houser, mac restrepo. So [facility] [phone].    ATENCIÓN: Si habla español, tiene a navarrete disposición servicios gratuitos de asistencia lingüística. Llame al [phone].    We comply with applicable federal civil rights laws and [place] laws. We do not discriminate on the basis of race, color, national origin, age, disability, sex, sexual orientation, or gender identity.            Thank you!     Thank you for choosing [facility]  for your care. Our goal is always to provide you with excellent care. Hearing back from our patients is one way we can continue to improve our services. Please take a few minutes to complete the written survey that you may receive in the mail after your visit with us. Thank you!             Your Updated Medication List - Protect others around you: Learn how to safely use, store and throw away your medicines at www.disposemymeds.org.          This list is accurate as of 10/24/18 11:59 PM.   Always use your most recent med list.                   Brand Name Dispense Instructions for use Diagnosis    buPROPion 300 MG 24 hr tablet    WELLBUTRIN XL    30 tablet    Take 1 tablet (300 mg) by mouth every morning    Recurrent major depressive disorder, remission status unspecified (H), Generalized anxiety disorder       IBUPROFEN PO      Take 200 mg by mouth every 4 hours as needed for moderate pain (Over 1200mg a day)        nabumetone 750 MG tablet    RELAFEN    30 tablet    Take 1 tablet (750 mg) by mouth daily    Cervicalgia, Left knee pain, unspecified chronicity, Right knee pain, unspecified chronicity

## 2018-10-24 NOTE — PROGRESS NOTES
SUBJECTIVE:   Bhavin Awad is a 57 year old male who presents to clinic today for the following health issues:      Medication Followup of Wellbutrin     Taking Medication as prescribed: yes    Side Effects:  Not sure    Medication Helping Symptoms:  Not sure            Problem list and histories reviewed & adjusted, as indicated.  Additional history: none        Reviewed and updated as needed this visit by clinical staff  Tobacco  Allergies  Meds  Med Hx  Surg Hx  Fam Hx  Soc Hx      Reviewed and updated as needed this visit by Provider         Homeless  (primary encounter diagnosis) in homeless shelter  Recurrent major depressive disorder, remission status unspecified (H)   PHQ-9 SCORE 8/28/2018 10/2/2018 10/25/2018   Total Score 14 18 20       Generalized anxiety disorder.     JOHNNIE-7 SCORE 8/28/2018 10/2/2018 10/25/2018   Total Score 19 21 18           Influenza vaccination declined by patient  Cervicalgia continues w/o radiculopathy  Essential hypertension.b  BP Readings from Last 1 Encounters:   10/24/18 133/84       Right knee pain, unspecified chronicity  Left knee pain, unspecified chronicity unchanged    Past Medical History:   Diagnosis Date     Cervicalgia 7/24/2018     DEPRESS PSYCHOSIS-SEVERE(aka DEPRESSION) 6/16/2004    admitted [facility] [place] [facility] in june 2004 , dr gonzales psychiatrist, significant suicidal ideation, and attempts       Essential hypertension 10/2/2018     Homeless 7/24/2018     LBP (low back pain)      Learning disability 7/24/2018     Left knee pain, unspecified chronicity 7/24/2018     Left shoulder pain, unspecified chronicity 7/24/2018     Non-cardiac chest pain 7/24/2018     Pulmonary nodules 7/24/2018     Right knee pain, unspecified chronicity 7/24/2018     Rotator cuff disorder        Past Surgical History:   Procedure Laterality Date     C EXPLORE SHOULDER JOINT      Arthroplasty, Shoulder, simonet, rotator cuff     HERNIORRHAPHY INGUINAL BILATERAL  "Bilateral 2015    Procedure: HERNIORRHAPHY INGUINAL BILATERAL;  Surgeon: Luís Conti MD;  Location: [facility] OR       Family History   Problem Relation Age of Onset     C.A.D. Father       age 66 MI      Cerebrovascular Disease Father      CREJI. Paternal Grandfather       in his 50 heart disease     Diabetes Mother        Social History   Substance Use Topics     Smoking status: Current Every Day Smoker     Packs/day: 1.00     Smokeless tobacco: Never Used     Alcohol use No      Comment: sobriety one year     ROS no intolerance, not suicidal, no systemic symptoms  /84 (BP Location: Left arm, Patient Position: Chair, Cuff Size: Adult Large)  Pulse 71  Temp 98.3  F (36.8  C) (Oral)  Resp 16  Ht 5' 9\" (1.753 m)  Wt 179 lb (81.2 kg)  BMI 26.43 kg/m2  Hypervigilant  No synovitis    ASSESSMENT / PLAN:  (Z59.0) Homeless  (primary encounter diagnosis)  Comment: previously living with son  Plan: CARE COORDINATION REFERRAL            (F33.9) Recurrent major depressive disorder, remission status unspecified (H)  Comment: tolerated  Plan: buPROPion (WELLBUTRIN XL) 300 MG 24 hr tablet        increase    (F41.1) Generalized anxiety disorder  Comment: increase  Plan: buPROPion (WELLBUTRIN XL) 300 MG 24 hr tablet,         CARE COORDINATION REFERRAL        May be eligible for CADI    (Z28.21) Influenza vaccination declined by patient  Comment:  Plan:     (M54.2) Cervicalgia  Comment: continue  Plan: nabumetone (RELAFEN) 750 MG tablet        Increased wellbutrin may assist    (I10) Essential hypertension  Comment: no meds  Plan: may not be present    (M25.561) Right knee pain, unspecified chronicity  Comment: previous imaging unrevealing  Plan: nabumetone (RELAFEN) 750 MG tablet      continue    (M25.562) Left knee pain, unspecified chronicity  Plan: nabumetone (RELAFEN) 750 MG tablet      continue            Andres Leal MD        "

## 2018-10-25 ENCOUNTER — OFFICE VISIT (OUTPATIENT)
Dept: PSYCHOLOGY | Facility: CLINIC | Age: 58
End: 2018-10-25
Payer: COMMERCIAL

## 2018-10-25 ENCOUNTER — PATIENT OUTREACH (OUTPATIENT)
Dept: CARE COORDINATION | Facility: CLINIC | Age: 58
End: 2018-10-25

## 2018-10-25 DIAGNOSIS — F33.9 RECURRENT MAJOR DEPRESSIVE DISORDER, REMISSION STATUS UNSPECIFIED (H): ICD-10-CM

## 2018-10-25 DIAGNOSIS — F41.1 GENERALIZED ANXIETY DISORDER: Primary | ICD-10-CM

## 2018-10-25 PROCEDURE — 90834 PSYTX W PT 45 MINUTES: CPT | Performed by: PSYCHOLOGIST

## 2018-10-25 ASSESSMENT — ANXIETY QUESTIONNAIRES
2. NOT BEING ABLE TO STOP OR CONTROL WORRYING: NEARLY EVERY DAY
3. WORRYING TOO MUCH ABOUT DIFFERENT THINGS: NEARLY EVERY DAY
GAD7 TOTAL SCORE: 18
5. BEING SO RESTLESS THAT IT IS HARD TO SIT STILL: NEARLY EVERY DAY
6. BECOMING EASILY ANNOYED OR IRRITABLE: NEARLY EVERY DAY
IF YOU CHECKED OFF ANY PROBLEMS ON THIS QUESTIONNAIRE, HOW DIFFICULT HAVE THESE PROBLEMS MADE IT FOR YOU TO DO YOUR WORK, TAKE CARE OF THINGS AT HOME, OR GET ALONG WITH OTHER PEOPLE: VERY DIFFICULT
1. FEELING NERVOUS, ANXIOUS, OR ON EDGE: NEARLY EVERY DAY
7. FEELING AFRAID AS IF SOMETHING AWFUL MIGHT HAPPEN: SEVERAL DAYS

## 2018-10-25 ASSESSMENT — PATIENT HEALTH QUESTIONNAIRE - PHQ9
5. POOR APPETITE OR OVEREATING: MORE THAN HALF THE DAYS
SUM OF ALL RESPONSES TO PHQ QUESTIONS 1-9: 20

## 2018-10-25 ASSESSMENT — ACTIVITIES OF DAILY LIVING (ADL): DEPENDENT_IADLS:: TRANSPORTATION;INDEPENDENT

## 2018-10-25 NOTE — MR AVS SNAPSHOT
MRN:[id number]                      After Visit Summary   10/25/2018    Bhavin Awad    MRN: [id number]           Visit Information        Provider Department      10/25/2018 1:00 PM Sharon Adame LP [facility] Generic      Your next 10 appointments already scheduled     Nov 08, 2018  2:00 PM CST   Return Visit with Sharon Adame LP   [facility] ([facility])    [address] [place] [address]   [phone]            Nov 19, 2018  1:00 PM CST   SHORT with Andres Leal MD   [facility]    [address] [facility] [id number] [phone]            Dec 06, 2018  2:00 PM CST   Return Visit with Sharon Adame LP   [facility] ([facility])    [address] [place] [id number] [phone]              Care EveryWhere ID     This is your Care EveryWhere ID. This could be used by other organizations to access your [facility] medical records  [id number]        Equal Access to Services     RANDA ORTIZ AH: Hadii renato heller hadasho Sobarb, waaxda luqadaha, qaybta kaalmada adedewey, mac restrepo. So [facility] [phone].    ATENCIÓN: Si habla español, tiene a navarrete disposición servicios gratUNM Sandoval Regional Medical Centeros de asistencia lingüística. Kam al [phone].    We comply with applicable federal civil rights laws and [place] laws. We do not discriminate on the basis of race, color, national origin, age, disability, sex, sexual orientation, or gender identity.

## 2018-10-25 NOTE — PROGRESS NOTES
Progress Note    Client Name: Bhavin Awad  Date: 10-25-18         Service Type: Individual      Session Start Time: 1:10  Session End Time: 2:00      Session Length: 38- 52  min     Session #: 4     Attendees: Client  W/ [facility] coordinator for 20 minutes  Treatment Plan Last Reviewed: today  PHQ-9 / JOHNNIE-7 : see flow sheets     DATA      Progress Since Last Session (Related to Symptoms / Goals / Homework):   Symptoms: no change overall stable but with anxiety, stress, sleep, appetite, energy  disruption, feeling bad about self, mood disruption    Homework: Completed in session      Episode of Care Goals: Satisfactory progress - PREPARATION (Decided to change - considering how); Intervened by negotiating a change plan and determining options / strategies for behavior change, identifying triggers, exploring social supports, and working towards setting a date to begin behavior change.    Is working with Lainey with [facility].   Disability services- will be filling out an application.    Has help from [employer] [address]. [phone] Disability Linkage Line.  Goals: 1) manage pain, 2) get his own place 3) money for the dl.     Current / Ongoing Stressors and Concerns:   Housing and mood disruption   Family discourse - son and daughter -in -law and kicked him  out of the house after an argument on  parenting   Concerned with daughter in law's drinking     Treatment Objective(s) Addressed in This Session:   Support     Intervention:     Reviewed symptom, stressors, skills used since last visit..    Client reports updates:  Has started medication and has had an increase in dosage.  Is on SNAP, has some limited money through [place]    Has a phone appointment with SSDI.   Needs birth certificate and a copy of his social security.   Juani from care coordination in for resource support- discussed future options of  LACHO and JANIE process.    Still in 24 hour housing in  [place] ( [address]).  Son's house remains as mail address.       Yesterday-  Had some time with his son and 2 grand kids.   Shares that he has same status- is welcome to visit only- but not stay there..    Wonders about not staying at a job more then 10 years.   Also comments that he has had multiple relationships.   Discussed barriers to employment.  Has had shift since last few visits; owning his choices.  Gave an example of \"not saying it out loud\" regarding  his  candid opinion - in this case with other roommate at the shelter.         ASSESSMENT: Current Emotional / Mental Status (status of significant symptoms):   Risk status (Self / Other harm or suicidal ideation)   Client denies current fears or concerns for personal safety.   Client denies current or recent suicidal ideation or behaviors.   Client denies current or recent homicidal ideation or behaviors.   Client denies current or recent self injurious behavior or ideation.   Client denies other safety concerns.   Client Client reports there has been a change in risk factors since their last session.  using a homeless shelter   Client Client reports there has been no change in protective factors since their last session.     A safety and risk management plan has not been developed at this time, however client was given the after-hours number / 911 should there be a change in any of these risk factors.     Appearance:   Appropriate    Eye Contact:   Good    Psychomotor Behavior: Normal    Attitude:   Cooperative    Orientation:   All   Speech    Rate / Production: Normal     Volume:  Normal    Mood:    Anxious  Depressed  Normal   Affect:    Appropriate    Thought Content:  Clear    Thought Form:  Coherent  Logical    Insight:    Good      Medication Review:   No current psychiatric medications prescribed     Medication Compliance:   NA     Changes in Health Issues:   None reported     Chemical Use Review:   Substance Use: " Chemical use reviewed, no active concerns identified      Tobacco Use: No current tobacco use.       Collateral Reports Completed:   Routed note to PCP as needed.    PLAN: (Client Tasks / Therapist Tasks / Other)   1) daily problem solving steps - food /sheltr  2) manage pain, 3) get his own place 4) money for the 's license.  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    Anticipated number of session or this episode of care: 12      MeasurableTreatment Goal(s) related to diagnosis / functional impairment(s)  Goal 1: Client will attend to  problem solving daily. Connect with social workers timely.    I will know I've met my goal when .  stable housing    Objective #A (Client Action)    Client will compile a list of boundaries that they would like to set with others. Increase diplomacy.  Status: New - Date: 10-02-18     Intervention(s)  Therapist will assit with problem solving.      Client has reviewed and agreed to the above plan.      Sharon Adame LP  October 2, 2018

## 2018-10-25 NOTE — PROGRESS NOTES
Clinic Care Coordination Contact    Clinic Care Coordination Contact  OUTREACH    Referral Information:  Referral Source: Care Team (Dr. Leal )    Primary Diagnosis: Psychosocial    Chief Complaint   Patient presents with     Clinic Care Coordination - Initial     PCP referral        Clinical Concerns:  Current Medical Concerns:  Anxiety and Depression    Current Behavioral Concerns: Homeless and MH symptoms are acting as barrier to accessing resources.    Education Provided to patient: CC role explained. Temporary disability via SMRT explained. GRH explained.     Pain  Pain (GOAL):: Yes, has goal with MH provider.  Health Maintenance Reviewed:      Functional Status:  Dependent ADLs:: Ambulation-no assistive device  Dependent IADLs:: Transportation, Independent  Bed or wheelchair confined:: No  Mobility Status: Independent    Living Situation:  Current living arrangement:: I live in a private home with family (currently living with son and daughter in law and grandchildren - they would like him out of the house by end of summer )    Diet/Exercise/Sleep:  Diet:: Regular  Inadequate nutrition (GOAL):: No  Food Insecurity: No  Tube Feeding: No    Transportation:  Transportation concerns (GOAL):: Yes  Transportation means:: Family, Medical transport     Psychosocial:  Mental health DX:: Yes (patient does not know his diagnosis - has been 10 years since he last saw mental health )  Mental health DX how managed:: None  Mental health management concern (GOAL):: Yes  Informal Support system:: Children     Financial/Insurance:   Financial/Insurance concerns (GOAL):: Yes  Patient is currently receiving [place] and [place], has phone interview with social security scheduled for early November, needs more immediate additional funds though for resource utilization.     Resources and Interventions:  Community Resources: County Programs (Snap food benefits and [place] )  Supplies used at home:: None  Equipment Currently Used at Home:  none    Advance Care Plan/Directive  Advanced Care Plans/Directives on file:: No    Referrals Placed: Behavioral Health Providers, Disability Linkage line (CCRN asked provider to order BHP )     Goals:   Goals        General    1: Financial Wellbeing (pt-stated)     Notes - Note created  7/26/2018 10:52 AM by Iman Dumont LSW    Goal Statement: I will obtain additional financial resources within 1-3 months to support my overall health and to obtain alternative housing.    Measure of Success: I will verbally report my updates to the care coordination team and PCP.   Supportive Steps to Achieve: I will outreach to my financial worker this week and inquire about cash assistance and have this worker send me the necessary forms to obtain additional support.  I will inquire about housing assistance as well when I talk with my financial worker.  I have agreed to outreach to the care coordination team after I talk with my financial worker if questions arise.   Barriers: I am currently residing with my son and may need to leave this residence in the future.    Strengths: I appear motivated to achieve additional financial supports and resources.    Date to Achieve By: 9/1/18        2: Transportation (pt-stated)     Notes - Note created  7/26/2018 10:56 AM by Iman Dumont LSW    Goal Statement: I will obtain rides through my insurance in 1-2 weeks for all upcoming medical appointments to ensure I am able to follow up with providers as recommended.    Measure of Success: I will verbally update care team after I talk with insurance or if questions arise.    Supportive Steps to Achieve: [facility] provided me with the [employer] [place] number to call today to line up medical rides. I was educated on this service today and reminded of the timeline to set up rides.  I can outreach to care coordination team if I have questions or concerns regarding transportation.   Barriers: Family rides are becoming difficult to line up, therefore I am  looking for additional resources.    Strengths: I appear motivated to attend all of my medical and MH appointments.    Date to Achieve By: 8/15/18                Patient/Caregiver understanding: Pt reports understanding and denies any additional questions or concerns at this times.  CC engaged in AIDET communication during encounter.    Outreach Frequency: monthly  Future Appointments              In 2 weeks Sharon Adame LP [facility], [facility]    In 3 weeks Andres Leal MD [facility], [facility]    In 1 month [facility]          Plan: SWCC will call patient tomorrow to establish plan for moving forward with accessing social security, temporary disability via SMRT application, and housing. CC introduction letter and Plan will be mailed out at that time.    Leonora Dougherty [facility]  Clinic Care Coordinator - [facility], and [facility]  Ph: [phone]  [email or web address]  (Today's date: 10/25/18)

## 2018-10-26 ENCOUNTER — PATIENT OUTREACH (OUTPATIENT)
Dept: CARE COORDINATION | Facility: CLINIC | Age: 58
End: 2018-10-26

## 2018-10-26 ASSESSMENT — ACTIVITIES OF DAILY LIVING (ADL): DEPENDENT_IADLS:: TRANSPORTATION;INDEPENDENT

## 2018-10-26 ASSESSMENT — ANXIETY QUESTIONNAIRES: GAD7 TOTAL SCORE: 18

## 2018-10-26 NOTE — LETTER
Health Care Home - Access Care Plan    About Me  Patient Name:  Bhavin Awad    YOB: 1960  Age:                             57 year old   Tim MRN:            [id number] Telephone Information:     Home Phone [phone]   Mobile [phone]       Address:    [address] [address] Email address:  No e-mail address on record      Emergency Contact(s)  Name Relationship Lgl Grd Work Phone Home Phone Mobile Phone   1. MAGALY IVY* Son  none [phone] none             Health Maintenance: Routine Health maintenance Reviewed: Due/Overdue     My Access Plan  Medical Emergency 911   Questions or concerns during clinic hours Primary Clinic Line, I will call the clinic directly: [facility] - [phone]   24 Hour Appointment Line [phone] or  [phone] [place] ([phone]) (toll free)   24 Hour Nurse Line [phone] (toll free)   Questions or concerns outside clinic hours 24 Hour Appointment Line, I will call the after-hours on-call line:   [facility] [phone] or [phone] ([phone]) (toll-free)   Preferred Urgent Care [facility] [phone]   Preferred Hospital [facility]  [phone]   Preferred Pharmacy [facility] [address]     Behavioral Health Crisis Line The National Suicide Prevention Lifeline at 1-608.560.7035 or 911     My Care Team Members  Patient Care Team       Relationship Specialty Notifications Start End    Andres Leal MD PCP - General Family Practice  10/11/18     Phone: [phone] Fax: [phone] [address] [address]    Andres Leal MD MD Family Practice  7/24/18     Phone: [phone] Fax: [phone] [address] [address]    Leonora Dougherty, [facility] Clinic Care Coordinator   10/25/18            My Medical and Care Information  Problem List   Patient Active  Problem List   Diagnosis     Raynaud's syndrome     Primary localized osteoarthrosis, other specified sites     Tobacco use disorder     Hyperlipidemia with target LDL less than 160     Non-cardiac chest pain     Pulmonary nodules     Right knee pain, unspecified chronicity     Left knee pain, unspecified chronicity     Left shoulder pain, unspecified chronicity     Cervicalgia     Learning disability     Homeless     Major depressive disorder     Generalized anxiety disorder     Essential hypertension      Current Medications and Allergies:  See printed Medication Report

## 2018-10-26 NOTE — LETTER
[facility]  [address]  [facility] [address]  October 26, 2018    Bhavin Awad  [address] [address] [address]      Dear Bhavin,    I am a clinic care coordinator who works with Andres Leal MD at [facility]. I recently tried to call and was unable to reach you. I wanted to provide you with my contact information so that you can call me with questions or concerns about your health care. Below is a description of clinic care coordination and how I can further assist you.     The clinic care coordinator is a registered nurse and/or  who understand the health care system. The goal of clinic care coordination is to help you manage your health and improve access to the [facility] system in the most efficient manner. The registered nurse can assist you in meeting your health care goals by providing education, coordinating services, and strengthening the communication among your providers. The  can assist you with financial, behavioral, psychosocial, chemical dependency, counseling, and/or psychiatric resources.    Please feel free to contact me at [phone], with any questions or concerns. We at [facility] are focused on providing you with the highest-quality healthcare experience possible and that all starts with you.     Sincerely,     Leonora Dougherty    Enclosed: I have enclosed a copy of a 24 Hour Access Plan. This has helpful phone numbers for you to call when needed. Please keep this in an easy to access place to use as needed.

## 2018-10-26 NOTE — PROGRESS NOTES
Clinic Care Coordination Contact  [facility]/Voicemail    Referral Source: Care Team (Dr. Leal )  Clinical Data: Care Coordinator Outreach  Outreach attempted x 1.  Left message on voicemail with call back information and requested return call.  Plan: Care Coordinator will mail out care coordination introduction letter with care coordinator contact information and explanation of care coordination services. Care Coordinator will try to reach patient again in 3-5 business days.    Leonora Dougherty [facility]  Clinic Care Coordinator - [facility] [place] and [facility]  Ph: [phone]  [email or web address]  (Today's date: 10/26/18)

## 2018-11-06 ENCOUNTER — PATIENT OUTREACH (OUTPATIENT)
Dept: CARE COORDINATION | Facility: CLINIC | Age: 58
End: 2018-11-06

## 2018-11-06 NOTE — PROGRESS NOTES
Clinic Care Coordination Contact  Care Team Conversations    RN CC received message from patient on voicemail requesting care coordination return call to discuss concern for housing. RN CC notes patient has been working with MANOL[facility] CC and warm hand off request call back to patient to discuss care coordination resources and assist with patient needs.    Esthela Sanchez RN  Clinic Care Coordinator  Office [phone]  [email or web address]

## 2018-11-08 ENCOUNTER — OFFICE VISIT (OUTPATIENT)
Dept: PSYCHOLOGY | Facility: CLINIC | Age: 58
End: 2018-11-08
Payer: COMMERCIAL

## 2018-11-08 ENCOUNTER — PATIENT OUTREACH (OUTPATIENT)
Dept: CARE COORDINATION | Facility: CLINIC | Age: 58
End: 2018-11-08

## 2018-11-08 DIAGNOSIS — F41.1 GENERALIZED ANXIETY DISORDER: Primary | ICD-10-CM

## 2018-11-08 DIAGNOSIS — F33.1 MODERATE EPISODE OF RECURRENT MAJOR DEPRESSIVE DISORDER (H): ICD-10-CM

## 2018-11-08 PROCEDURE — 90834 PSYTX W PT 45 MINUTES: CPT | Performed by: PSYCHOLOGIST

## 2018-11-08 ASSESSMENT — ANXIETY QUESTIONNAIRES
2. NOT BEING ABLE TO STOP OR CONTROL WORRYING: NEARLY EVERY DAY
6. BECOMING EASILY ANNOYED OR IRRITABLE: NEARLY EVERY DAY
7. FEELING AFRAID AS IF SOMETHING AWFUL MIGHT HAPPEN: NEARLY EVERY DAY
GAD7 TOTAL SCORE: 20
1. FEELING NERVOUS, ANXIOUS, OR ON EDGE: MORE THAN HALF THE DAYS
3. WORRYING TOO MUCH ABOUT DIFFERENT THINGS: NEARLY EVERY DAY
IF YOU CHECKED OFF ANY PROBLEMS ON THIS QUESTIONNAIRE, HOW DIFFICULT HAVE THESE PROBLEMS MADE IT FOR YOU TO DO YOUR WORK, TAKE CARE OF THINGS AT HOME, OR GET ALONG WITH OTHER PEOPLE: VERY DIFFICULT
5. BEING SO RESTLESS THAT IT IS HARD TO SIT STILL: NEARLY EVERY DAY

## 2018-11-08 ASSESSMENT — PATIENT HEALTH QUESTIONNAIRE - PHQ9
SUM OF ALL RESPONSES TO PHQ QUESTIONS 1-9: 19
5. POOR APPETITE OR OVEREATING: NEARLY EVERY DAY

## 2018-11-08 ASSESSMENT — ACTIVITIES OF DAILY LIVING (ADL): DEPENDENT_IADLS:: TRANSPORTATION;INDEPENDENT

## 2018-11-08 NOTE — PROGRESS NOTES
Clinic Care Coordination Contact    Clinic Care Coordination Contact  OUTREACH    Referral Information:  Referral Source: Care Team (Dr. Leal )    Primary Diagnosis: Psychosocial    Chief Complaint   Patient presents with     Clinic Care Coordination - Follow-up     Financial and housing needs        Clinical Concerns:  Current Medical Concerns:  Major Depressive d/o     Current Behavioral Concerns: Frustration over various stressors.    Education Provided to patient: [facility] reviewed SMRT process, coping skills, and housing resources including [place] and Adult Shelter Connect.   Pain  Pain (GOAL):: No  Health Maintenance Reviewed:    Functional Status:  Dependent ADLs:: Ambulation-no assistive device  Dependent IADLs:: Transportation, Independent  Bed or wheelchair confined:: No  Mobility Status: Independent    Living Situation:  Current living arrangement:: I live in a private home with family (currently living with son and daughter in law and grandchildren - they would like him out of the house by end of summer )    Diet/Exercise/Sleep:  Diet:: Regular  Inadequate nutrition (GOAL):: No  Food Insecurity: No  Tube Feeding: No    Transportation:  Transportation concerns (GOAL):: Yes  Transportation means:: Family, Medical transport     Psychosocial:  Mental health DX:: Yes (patient does not know his diagnosis - has been 10 years since he last saw mental health )  Mental health DX how managed:: None  Mental health management concern (GOAL):: Yes  Informal Support system:: Children     Financial/Insurance:   Financial/Insurance concerns (GOAL):: Yes     Resources and Interventions:  Current Resources:    ;   Community Resources: [place] Programs (Snap food benefits and [place] )  Supplies used at home:: None  Equipment Currently Used at Home: none    Advance Care Plan/Directive  Advanced Care Plans/Directives on file:: No    Referrals Placed: Behavioral Health Providers, Disability Linkage line (CCRN asked provider to  order [facility] )     Goals:   Goals        General    1: Financial Wellbeing (pt-stated)     Notes - Note created  7/26/2018 10:52 AM by Iman Dumont LSW    Goal Statement: I will obtain additional financial resources within 1-3 months to support my overall health and to obtain alternative housing.    Measure of Success: I will verbally report my updates to the care coordination team and PCP.   Supportive Steps to Achieve: I will outreach to my financial worker this week and inquire about cash assistance and have this worker send me the necessary forms to obtain additional support.  I will inquire about housing assistance as well when I talk with my financial worker.  I have agreed to outreach to the care coordination team after I talk with my financial worker if questions arise.   Barriers: I am currently residing with my son and may need to leave this residence in the future.    Strengths: I appear motivated to achieve additional financial supports and resources.    Date to Achieve By: 9/1/18        2: Transportation (pt-stated)     Notes - Note created  7/26/2018 10:56 AM by Iman Dumont LSW    Goal Statement: I will obtain rides through my insurance in 1-2 weeks for all upcoming medical appointments to ensure I am able to follow up with providers as recommended.    Measure of Success: I will verbally update care team after I talk with insurance or if questions arise.    Supportive Steps to Achieve: [facility] provided me with the [facility] [place] number to call today to line up medical rides. I was educated on this service today and reminded of the timeline to set up rides.  I can outreach to care coordination team if I have questions or concerns regarding transportation.   Barriers: Family rides are becoming difficult to line up, therefore I am looking for additional resources.    Strengths: I appear motivated to attend all of my medical and MH appointments.    Date to Achieve By: 8/15/18        Psychosocial (pt-stated)      Notes - Note created  11/8/2018  2:56 PM by Leonora Dougherty LGSW    Goal Statement: I will be living somewhere better.  Measure of Success: Complete an assessment for Adult Shelter Connect.  Supportive Steps to Achieve: Work with Care Coordination, maintain appointments with providers, practice coping skills, maintain medication compliance.  Barriers: Homelessness, financial insecurity.  Strengths: Motivated and determined, engaged in Care Coordination.  Date to Achieve By: 12/8/18  Patient expressed understanding of goal: Pt reports understanding and denies any additional questions or concerns at this times. SW CC engaged in AIDET communication during encounter.        Psychosocial (pt-stated)     Notes - Note created  11/8/2018  2:58 PM by Leonora Dougherty LGSW    Goal Statement: I will have better income and resources..  Measure of Success: Be referred to SMRT.  Supportive Steps to Achieve: Work with providers and care coordinator to facilitate timely referral.  Barriers: Homelessness, financial insecurity.  Strengths: Motivated and determined.  Date to Achieve By: 12/8/18  Patient expressed understanding of goal: Pt reports understanding and denies any additional questions or concerns at this times. SW CC engaged in AIDET communication during encounter.                Patient/Caregiver understanding: Pt reports understanding and denies any additional questions or concerns at this times. SW CC engaged in AIDET communication during encounter.    Outreach Frequency: weekly  Future Appointments              In 1 week Andres Leal MD [facility]    In 4 weeks Sharon Adame LP [facility], [facility]          Plan: CC will start SMRT referral process and will collect info on CSPs. Patient will work on assessment for Adult Shelter [facility]. In person meeting in one week at clinic.    CASI Hall  Clinic Care Coordinator - [place]  and [facility]  Ph: [phone]  [email or web address]  (Today's date: 11/8/18)

## 2018-11-08 NOTE — MR AVS SNAPSHOT
MRN:[id number]                      After Visit Summary   11/8/2018    Bhavin Awad    MRN: [id number]           Visit Information        Provider Department      11/8/2018 2:00 PM Sharon Adame LP [facility] Generic      Your next 10 appointments already scheduled     Nov 19, 2018  1:00 PM CST   SHORT with Andres Leal MD   [facility] ([facility])    [id number] [place] [address] [phone]            Dec 06, 2018  2:00 PM CST   Return Visit with Sharon Adame LP   [facility] ([facility])    [address] [place] [id number] [phone]              Care EveryWhere ID     This is your Care EveryWhere ID. This could be used by other organizations to access your [facility] medical records  [id number]        Equal Access to Services     RANDA ORTIZ AH: Hadii renato coppolao Sobarb, waaxda luqadaha, qaybta kaalmada adeasafyarose mary, mac restrepo. So [facility] [phone].    ATENCIÓN: Si habla español, tiene a navarrete disposición servicios gratuitos de asistencia lingüística. Llame al [phone].    We comply with applicable federal civil rights laws and [place] laws. We do not discriminate on the basis of race, color, national origin, age, disability, sex, sexual orientation, or gender identity.

## 2018-11-09 ASSESSMENT — ANXIETY QUESTIONNAIRES: GAD7 TOTAL SCORE: 20

## 2018-11-13 ENCOUNTER — PATIENT OUTREACH (OUTPATIENT)
Dept: CARE COORDINATION | Facility: CLINIC | Age: 58
End: 2018-11-13

## 2018-11-13 ASSESSMENT — ACTIVITIES OF DAILY LIVING (ADL): DEPENDENT_IADLS:: TRANSPORTATION;INDEPENDENT

## 2018-11-13 NOTE — PROGRESS NOTES
Clinic Care Coordination Contact  [facility]/Voicemail    Referral Source: Care Team (Dr. Leal )  Clinical Data: Care Coordinator Outreach  Outreach attempted x 1.  Left message on voicemail with call back information and requested return call.  Plan: Care Coordinator has not mailed out CC intro letter due to patient being homeless. Care Coordinator will try to reach patient again in 1-2 business days.    Leonora Dougherty [facility]  Clinic Care Coordinator - [facility], and [facility]  Ph: [phone]  [email or web address]  (Today's date: 11/13/18)    Update: Patient called [facility] and confirmed arranged in-clinic meeting for this Thursday at 2pm.

## 2018-11-15 ENCOUNTER — PATIENT OUTREACH (OUTPATIENT)
Dept: CARE COORDINATION | Facility: CLINIC | Age: 58
End: 2018-11-15

## 2018-11-15 ASSESSMENT — ACTIVITIES OF DAILY LIVING (ADL): DEPENDENT_IADLS:: TRANSPORTATION;INDEPENDENT

## 2018-11-15 NOTE — PROGRESS NOTES
Clinic Care Coordination Contact    Clinic Care Coordination Contact  OUTREACH    Referral Information:  Referral Source: Care Team (Dr. Leal )    Primary Diagnosis: Psychosocial    Chief Complaint   Patient presents with     Clinic Care Coordination - Follow-up        Clinical Concerns:  Current Medical Concerns:  Depression    Current Behavioral Concerns: Patient experiencing frustration about current stressors and this is acting as a barrier to effective utilization of community resources.    Education Provided to patient: [facility] role reviewed. Community Support Program resources provided. Requirements for alternative shelter access reviewed. [facility] Counseling resource provided.   Pain  Pain (GOAL):: No  Health Maintenance Reviewed:    Functional Status:  Dependent ADLs:: Ambulation-no assistive device  Dependent IADLs:: Transportation, Independent  Bed or wheelchair confined:: No  Mobility Status: Independent    Living Situation:  Current living arrangement:: Homeless  Diet/Exercise/Sleep:  Diet:: Regular  Inadequate nutrition (GOAL):: No  Food Insecurity: No  Tube Feeding: No    Transportation:  Transportation concerns (GOAL):: Yes  Transportation means:: Family, Medical transport     Psychosocial:  Mental health DX:: Yes (patient does not know his diagnosis - has been 10 years since he last saw mental health )  Mental health DX how managed:: None  Mental health management concern (GOAL):: Yes  Informal Support system:: Children     Community Resources: [facility] Programs ([employer] benefits and MA )  Supplies used at home:: None  Equipment Currently Used at Home: none    Advance Care Plan/Directive  Advanced Care Plans/Directives on file:: No    Referrals Placed: Behavioral Health Providers, Disability Linkage line (CCRN asked provider to order BHP )     Goals:   Goals        General    1: Financial Wellbeing (pt-stated)     Notes - Note created  7/26/2018 10:52 AM by Iman Dumont LSW    Goal Statement: I  will obtain additional financial resources within 1-3 months to support my overall health and to obtain alternative housing.    Measure of Success: I will verbally report my updates to the care coordination team and PCP.   Supportive Steps to Achieve: I will outreach to my financial worker this week and inquire about cash assistance and have this worker send me the necessary forms to obtain additional support.  I will inquire about housing assistance as well when I talk with my financial worker.  I have agreed to outreach to the care coordination team after I talk with my financial worker if questions arise.   Barriers: I am currently residing with my son and may need to leave this residence in the future.    Strengths: I appear motivated to achieve additional financial supports and resources.    Date to Achieve By: 9/1/18        2: Transportation (pt-stated)     Notes - Note created  7/26/2018 10:56 AM by Iman Dumont LSW    Goal Statement: I will obtain rides through my insurance in 1-2 weeks for all upcoming medical appointments to ensure I am able to follow up with providers as recommended.    Measure of Success: I will verbally update care team after I talk with insurance or if questions arise.    Supportive Steps to Achieve: [facility] provided me with the [employer] MA number to call today to line up medical rides. I was educated on this service today and reminded of the timeline to set up rides.  I can outreach to care coordination team if I have questions or concerns regarding transportation.   Barriers: Family rides are becoming difficult to line up, therefore I am looking for additional resources.    Strengths: I appear motivated to attend all of my medical and MH appointments.    Date to Achieve By: 8/15/18        Psychosocial (pt-stated)     Notes - Note created  11/8/2018  2:56 PM by Leonora Dougherty, CASI    Goal Statement: I will be living somewhere better.  Measure of Success: Complete an assessment for  [facility] Shelter [facility].  Supportive Steps to Achieve: Work with Care Coordination, maintain appointments with providers, practice coping skills, maintain medication compliance.  Barriers: Homelessness, financial insecurity.  Strengths: Motivated and determined, engaged in Care Coordination.  Date to Achieve By: 12/8/18  Patient expressed understanding of goal: Pt reports understanding and denies any additional questions or concerns at this times. SW CC engaged in AIDET communication during encounter.        Psychosocial (pt-stated)     Notes - Note created  11/8/2018  2:58 PM by Leonora Dougherty LGSW    Goal Statement: I will have better income and resources..  Measure of Success: Be referred to SMRT.  Supportive Steps to Achieve: Work with providers and care coordinator to facilitate timely referral.  Barriers: Homelessness, financial insecurity.  Strengths: Motivated and determined.  Date to Achieve By: 12/8/18  Patient expressed understanding of goal: Pt reports understanding and denies any additional questions or concerns at this times. SW CC engaged in AIDET communication during encounter.                Patient/Caregiver understanding: Pt reports understanding and denies any additional questions or concerns at this times. SW CC engaged in AIDET communication during encounter.    Outreach Frequency: weekly  Future Appointments              In 4 days Andres Leal MD [facility], [facility]    In 3 weeks Sharon Adame LP [facility], [facility]          Plan: [facility] to meet with patient next Monday in clinic. Patient plans to visit Walk In Counseling and to also follow up with [facility] re: housing and birth certificate. He also plans to make phone call re: completing assessment for Adult [facility]ter [facility].    CASI Hall  Clinic Care Coordinator - [facility] and [facility]  Ph: [phone]  [email or web address]  (Today's date:  11/15/18)

## 2018-11-16 ENCOUNTER — PATIENT OUTREACH (OUTPATIENT)
Dept: CARE COORDINATION | Facility: CLINIC | Age: 58
End: 2018-11-16

## 2018-11-16 ASSESSMENT — ACTIVITIES OF DAILY LIVING (ADL): DEPENDENT_IADLS:: TRANSPORTATION;INDEPENDENT

## 2018-11-16 NOTE — PROGRESS NOTES
Clinic Care Coordination Contact  [facility]/Voicemail    Referral Source: Care Team (Dr. Leal )  Clinical Data: Care Coordinator Outreach  Outreach attempted x 1.  Left message on voicemail with call back information and requested return call.  Plan: Care Coordinator left message for patient's financial worker. Care Coordinator will try to reach financial worker again in 1-2 business days, will do in-person clinic outreach in 1 business day.    Leonora Dougherty [facility]  Clinic Care Coordinator - [facility], and [facility]  Ph: [phone]  [email or web address]  (Today's date: 11/15/18)

## 2018-11-19 ENCOUNTER — PATIENT OUTREACH (OUTPATIENT)
Dept: CARE COORDINATION | Facility: CLINIC | Age: 58
End: 2018-11-19

## 2018-11-19 ENCOUNTER — OFFICE VISIT (OUTPATIENT)
Dept: FAMILY MEDICINE | Facility: CLINIC | Age: 58
End: 2018-11-19
Payer: COMMERCIAL

## 2018-11-19 VITALS
BODY MASS INDEX: 26.07 KG/M2 | HEIGHT: 69 IN | TEMPERATURE: 98.7 F | HEART RATE: 71 BPM | DIASTOLIC BLOOD PRESSURE: 84 MMHG | RESPIRATION RATE: 16 BRPM | OXYGEN SATURATION: 98 % | WEIGHT: 176 LBS | SYSTOLIC BLOOD PRESSURE: 139 MMHG

## 2018-11-19 DIAGNOSIS — M54.2 CERVICALGIA: ICD-10-CM

## 2018-11-19 DIAGNOSIS — F41.1 GENERALIZED ANXIETY DISORDER: ICD-10-CM

## 2018-11-19 DIAGNOSIS — Z12.11 SPECIAL SCREENING FOR MALIGNANT NEOPLASMS, COLON: ICD-10-CM

## 2018-11-19 DIAGNOSIS — M25.562 LEFT KNEE PAIN, UNSPECIFIED CHRONICITY: ICD-10-CM

## 2018-11-19 DIAGNOSIS — M25.551 HIP PAIN, RIGHT: Primary | ICD-10-CM

## 2018-11-19 DIAGNOSIS — M25.561 RIGHT KNEE PAIN, UNSPECIFIED CHRONICITY: ICD-10-CM

## 2018-11-19 DIAGNOSIS — F33.1 MODERATE EPISODE OF RECURRENT MAJOR DEPRESSIVE DISORDER (H): ICD-10-CM

## 2018-11-19 DIAGNOSIS — M54.50 MIDLINE LOW BACK PAIN WITHOUT SCIATICA, UNSPECIFIED CHRONICITY: ICD-10-CM

## 2018-11-19 DIAGNOSIS — F81.9 LEARNING DISABILITY: ICD-10-CM

## 2018-11-19 PROCEDURE — 99214 OFFICE O/P EST MOD 30 MIN: CPT | Performed by: FAMILY MEDICINE

## 2018-11-19 RX ORDER — TOPIRAMATE 25 MG/1
TABLET, FILM COATED ORAL
Qty: 30 TABLET | Refills: 0 | Status: SHIPPED | OUTPATIENT
Start: 2018-11-19 | End: 2019-01-22

## 2018-11-19 RX ORDER — BUPROPION HYDROCHLORIDE 300 MG/1
300 TABLET ORAL EVERY MORNING
Qty: 30 TABLET | Refills: 2 | Status: SHIPPED | OUTPATIENT
Start: 2018-11-19 | End: 2019-01-22

## 2018-11-19 RX ORDER — TOPIRAMATE 100 MG/1
100 TABLET, FILM COATED ORAL DAILY
Qty: 30 TABLET | Refills: 2 | Status: SHIPPED | OUTPATIENT
Start: 2018-11-19 | End: 2019-01-22

## 2018-11-19 RX ORDER — BUPROPION HYDROCHLORIDE 150 MG/1
TABLET ORAL
COMMUNITY
Start: 2018-10-04 | End: 2018-11-19

## 2018-11-19 ASSESSMENT — ACTIVITIES OF DAILY LIVING (ADL): DEPENDENT_IADLS:: TRANSPORTATION;INDEPENDENT

## 2018-11-19 NOTE — PROGRESS NOTES
"  SUBJECTIVE:   Bhavin Awad is a 58 year old male who presents to clinic today for the following health issues:      HPI  Hypertension Follow-up    Follow up from 10/24/18 Homeless        Hip pain, right  (primary encounter diagnosis) he reports this hampers his ambulation XR 2000 minimal DJD  Moderate episode of recurrent major depressive disorder (H)   PHQ-9 SCORE 10/2/2018 10/25/2018 11/8/2018   Total Score 18 20 19       Midline low back pain without sciatica, unspecified chronicity unresponsive to current previous Rx XR Oct minimal DJD  Cervicalgia same XR Oct minimal DJD  Left knee pain, unspecified chronicity same XR Oct minimal DJD  Right knee pain, unspecified chronicity same XR Oct minimal DJD  Generalized anxiety disorder   JOHNNIE-7 SCORE 10/2/2018 10/25/2018 11/8/2018   Total Score 21 18 20         Special screening for malignant neoplasms, colon due    SOC Remains in shelter. Applying for disability. Process stops at paperwork (suspecyt illiteracy, reported  Learning disability), phone calls (restricted phone minutes)    ROS little response to bupropion, no intolerance    /84 (BP Location: Right arm, Patient Position: Chair, Cuff Size: Adult Regular)  Pulse 71  Temp 98.7  F (37.1  C) (Oral)  Resp 16  Ht 5' 9\" (1.753 m)  Wt 176 lb (79.8 kg)  SpO2 98%  BMI 25.99 kg/m2  No synovitis  Hip rotation yields no pain  No antalgia    ASSESSMENT / PLAN:  (M25.551) Hip pain, right  (primary encounter diagnosis)  Comment: with pain exaggeration  Plan: topiramate (TOPAMAX) 25 MG tablet, topiramate         (TOPAMAX) 100 MG tablet      Add, taper up    (F33.1) Moderate episode of recurrent major depressive disorder (H)  Comment: increase  Plan: buPROPion (WELLBUTRIN XL) 300 MG 24 hr tablet            (M54.5) Midline low back pain without sciatica, unspecified chronicity  Comment: chronic pain therapies  Plan: topiramate (TOPAMAX) 25 MG tablet, topiramate         (TOPAMAX) 100 MG tablet        Strong " supratentorial component    (M54.2) Cervicalgia  Comment: continue  Plan: nabumetone (RELAFEN) 750 MG tablet            (M25.562) Left knee pain, unspecified chronicity  Comment: continue  Plan: nabumetone (RELAFEN) 750 MG tablet            (M25.561) Right knee pain, unspecified chronicity  Comment: continue  Plan: nabumetone (RELAFEN) 750 MG tablet            (F41.1) Generalized anxiety disorder  Comment: increase  Plan: buPROPion (WELLBUTRIN XL) 300 MG 24 hr tablet            (Z12.11) Special screening for malignant neoplasms, colon  Comment: dispense  Plan: Fecal colorectal cancer screen (FIT)        ''Care coordination meets with pt to assist with additional needs          Andres Leal MD

## 2018-11-19 NOTE — PROGRESS NOTES
Clinic Care Coordination Contact    Clinic Care Coordination Contact  OUTREACH    Referral Information:  Referral Source: Care Team (Dr. Leal )    Primary Diagnosis: Psychosocial    Chief Complaint   Patient presents with     Clinic Care Coordination - Follow-up        [place] Utilization:   Clinic Utilization  Difficulty keeping appointments:: No  Compliance Concerns: No  No-Show Concerns: No  No PCP office visit in Past Year: No  Utilization    Last refreshed: 11/19/2018  1:35 PM:  No Show Count (past year) 1       Last refreshed: 11/19/2018  1:35 PM:  ED visits 1       Last refreshed: 11/19/2018  1:35 PM:  Hospital admissions 0          Current as of: 11/19/2018  1:35 PM             Clinical Concerns:  Current Medical Concerns:  Hip pain.    Current Behavioral Concerns: Frustration re: psychosocial barriers which is further exacerbating those barriers.    Education Provided to patient: SWCC role reviewed. Importance of coping skills also reviewed.   Pain  Pain (GOAL):: No  Health Maintenance Reviewed: Due/Overdue     SWCC saw patient in clinic prior to his PCP appointment. SWCC and patient discussed patient's continuing struggle to complete required SSDI paperwork by deadline, due in part to limited minutes on his phone.    Functional Status:  Dependent ADLs:: Ambulation-no assistive device  Dependent IADLs:: Transportation, Independent  Bed or wheelchair confined:: No  Mobility Status: Independent    Living Situation:  Current living arrangement:: I live alone (currently living with son and daughter in law and grandchildren - they would like him out of the house by end of summer )  Type of residence:: Homeless    Diet/Exercise/Sleep:  Diet:: Regular  Inadequate nutrition (GOAL):: No  Food Insecurity: No  Tube Feeding: No  Exercise:: Yes  Days per week of moderate to strenuous exercise (like a brisk walk): 7  How intense was your typical exercise? : Moderate (like brisk walking)  Inadequate activity/exercise  (GOAL):: No  Significant changes in sleep pattern (GOAL): No    Transportation:  Transportation concerns (GOAL):: Yes  Transportation means:: Family, Medical transport     Psychosocial:  Mental health DX:: Yes (patient does not know his diagnosis - has been 10 years since he last saw mental health )  Mental health DX how managed:: None  Mental health management concern (GOAL):: Yes  Informal Support system:: Children     Financial/Insurance:   Financial/Insurance concerns (GOAL):: Yes     Resources and Interventions:  Current Resources:    ;   Community Resources: [place] Programs ([employer] benefits and [place] )  Supplies used at home:: None  Equipment Currently Used at Home: none    Advance Care Plan/Directive  Advanced Care Plans/Directives on file:: No    Referrals Placed: Behavioral Health Providers, Disability Linkage line (CCRN asked provider to order BHP )     Goals:   Goals        General    1: Financial Wellbeing (pt-stated)     Notes - Note created  7/26/2018 10:52 AM by Iman Dumont LSW    Goal Statement: I will obtain additional financial resources within 1-3 months to support my overall health and to obtain alternative housing.    Measure of Success: I will verbally report my updates to the care coordination team and PCP.   Supportive Steps to Achieve: I will outreach to my financial worker this week and inquire about cash assistance and have this worker send me the necessary forms to obtain additional support.  I will inquire about housing assistance as well when I talk with my financial worker.  I have agreed to outreach to the care coordination team after I talk with my financial worker if questions arise.   Barriers: I am currently residing with my son and may need to leave this residence in the future.    Strengths: I appear motivated to achieve additional financial supports and resources.    Date to Achieve By: 9/1/18        2: Transportation (pt-stated)     Notes - Note created  7/26/2018 10:56  AM by Iman Dumont LSW    Goal Statement: I will obtain rides through my insurance in 1-2 weeks for all upcoming medical appointments to ensure I am able to follow up with providers as recommended.    Measure of Success: I will verbally update care team after I talk with insurance or if questions arise.    Supportive Steps to Achieve: [facility] provided me with the [employer] MA number to call today to line up medical rides. I was educated on this service today and reminded of the timeline to set up rides.  I can outreach to care coordination team if I have questions or concerns regarding transportation.   Barriers: Family rides are becoming difficult to line up, therefore I am looking for additional resources.    Strengths: I appear motivated to attend all of my medical and MH appointments.    Date to Achieve By: 8/15/18        Psychosocial (pt-stated)     Notes - Note created  11/8/2018  2:56 PM by Leonora Dougherty LGSW    Goal Statement: I will be living somewhere better.  Measure of Success: Complete an assessment for [facility]ter [facility].  Supportive Steps to Achieve: Work with Care Coordination, maintain appointments with providers, practice coping skills, maintain medication compliance.  Barriers: Homelessness, financial insecurity.  Strengths: Motivated and determined, engaged in Care Coordination.  Date to Achieve By: 12/8/18  Patient expressed understanding of goal: Pt reports understanding and denies any additional questions or concerns at this times. [facility] engaged in AIDET communication during encounter.        Psychosocial (pt-stated)     Notes - Note created  11/8/2018  2:58 PM by Leonora Dougherty LGSW    Goal Statement: I will have better income and resources..  Measure of Success: Be referred to SMRT.  Supportive Steps to Achieve: Work with providers and care coordinator to facilitate timely referral.  Barriers: Homelessness, financial insecurity.  Strengths: Motivated and determined.  Date to Achieve  By: 12/8/18  Patient expressed understanding of goal: Pt reports understanding and denies any additional questions or concerns at this times. SW CC engaged in AIDET communication during encounter.                Patient/Caregiver understanding: Pt reports understanding and denies any additional questions or concerns at this times. SW CC engaged in AIDET communication during encounter.    Outreach Frequency: weekly  Future Appointments              In 2 weeks Sharon Adame LP [facility], [facility]          Plan: Plan: SWCC will meet with patient in clinic this Friday to complete SSDI paperwork, including calling social security office for detailed work history needed for paperwork. SWCC will also assist in accessing housing resources, as patient continues to face barriers with accessing these.

## 2018-11-19 NOTE — LETTER
[facility]  Complex Care Plan  About Me  Patient Name:  Bhavin Awad    YOB: 1960  Age:     58 year old   [place] MRN:   [id number] Telephone Information:    Home Phone [phone]   Mobile [phone]       Address:    [address]  [place] [address] Email address:  No e-mail address on record      Emergency Contact(s)  Name Relationship Lgl Grd Work Phone Home Phone Mobile Phone   1. MAGALY IVY* Son  none [phone] none           Primary language:  English     needed? No   [place] [employer]:  [phone] op. 1  Other communication barriers:    Preferred Method of Communication:  Mail  Current living arrangement:    Mobility Status/ Medical Equipment:      Health Maintenance  Health Maintenance Reviewed:      My Access Plan  Medical Emergency 911   Primary Clinic Line [facility] - [phone]   24 Hour Appointment Line [phone] or  [phone]) (toll-free)   24 Hour Nurse Line [phone] (toll-free)   Preferred Urgent Care     Preferred Hospital     Preferred Pharmacy [facility] [place] [facility] [place], [place] - [address]     Behavioral Health Crisis Line The National Suicide Prevention Lifeline at 1-689.416.2024 or 911     My Care Team Members    Patient Care Team       Relationship Specialty Notifications Start End    Andres Leal MD PCP - General Family Practice  10/11/18     Phone: [phone] Fax: [phone] [address] [facility] [address]    Andres Leal MD MD Family Practice  7/24/18     Phone: [phone] Fax: [phone] [address] [facility] [address]    Leonora Dougherty LGSW Lead Care Coordinator   10/25/18             My Care Plans  Self Management and Treatment Plan  Goals and (Comments)  Goals        General    1: Financial Wellbeing (pt-stated)     Notes - Note created  7/26/2018 10:52 AM by Iman Dumont LSW    Goal Statement: I  will obtain additional financial resources within 1-3 months to support my overall health and to obtain alternative housing.    Measure of Success: I will verbally report my updates to the care coordination team and PCP.   Supportive Steps to Achieve: I will outreach to my financial worker this week and inquire about cash assistance and have this worker send me the necessary forms to obtain additional support.  I will inquire about housing assistance as well when I talk with my financial worker.  I have agreed to outreach to the care coordination team after I talk with my financial worker if questions arise.   Barriers: I am currently residing with my son and may need to leave this residence in the future.    Strengths: I appear motivated to achieve additional financial supports and resources.    Date to Achieve By: 9/1/18        2: Transportation (pt-stated)     Notes - Note created  7/26/2018 10:56 AM by Iman Dumont LSW    Goal Statement: I will obtain rides through my insurance in 1-2 weeks for all upcoming medical appointments to ensure I am able to follow up with providers as recommended.    Measure of Success: I will verbally update care team after I talk with insurance or if questions arise.    Supportive Steps to Achieve: [facility] provided me with the [employer] MA number to call today to line up medical rides. I was educated on this service today and reminded of the timeline to set up rides.  I can outreach to care coordination team if I have questions or concerns regarding transportation.   Barriers: Family rides are becoming difficult to line up, therefore I am looking for additional resources.    Strengths: I appear motivated to attend all of my medical and MH appointments.    Date to Achieve By: 8/15/18        Psychosocial (pt-stated)     Notes - Note created  11/8/2018  2:56 PM by Leonora Dougherty, CASI    Goal Statement: I will be living somewhere better.  Measure of Success: Complete an assessment for  [facility] Shelter [facility].  Supportive Steps to Achieve: Work with Care Coordination, maintain appointments with providers, practice coping skills, maintain medication compliance.  Barriers: Homelessness, financial insecurity.  Strengths: Motivated and determined, engaged in Care Coordination.  Date to Achieve By: 12/8/18  Patient expressed understanding of goal: Pt reports understanding and denies any additional questions or concerns at this times. [facility] CC engaged in AIDET communication during encounter.        Psychosocial (pt-stated)     Notes - Note created  11/8/2018  2:58 PM by Leonora Dougherty LGSW    Goal Statement: I will have better income and resources..  Measure of Success: Be referred to SMRT.  Supportive Steps to Achieve: Work with providers and care coordinator to facilitate timely referral.  Barriers: Homelessness, financial insecurity.  Strengths: Motivated and determined.  Date to Achieve By: 12/8/18  Patient expressed understanding of goal: Pt reports understanding and denies any additional questions or concerns at this times. [facility] CC engaged in AIDET communication during encounter.               Action Plans on File:                       Advance Care Plans/Directives Type:        My Medical and Care Information  Problem List   Patient Active Problem List   Diagnosis     Raynaud's syndrome     Primary localized osteoarthrosis, other specified sites     Tobacco use disorder     Hyperlipidemia with target LDL less than 160     Non-cardiac chest pain     Pulmonary nodules     Right knee pain, unspecified chronicity     Left knee pain, unspecified chronicity     Left shoulder pain, unspecified chronicity     Cervicalgia     Learning disability     Homeless     Major depressive disorder     Generalized anxiety disorder     Essential hypertension      Current Medications and Allergies:  See printed Medication Report.    Care Coordination Start Date: No linked episodes   Frequency of Care Coordination:     Form  Last Updated: 11/19/2018

## 2018-11-19 NOTE — MR AVS SNAPSHOT
After Visit Summary   11/19/2018    Bhavin Awad    MRN: [id number]           Patient Information     Date Of Birth          1960        Visit Information        Provider Department      11/19/2018 1:00 PM Andres Leal MD [facility]        Today's Diagnoses     Hip pain, right    -  1    Moderate episode of recurrent major depressive disorder (H)        Midline low back pain without sciatica, unspecified chronicity        Cervicalgia        Left knee pain, unspecified chronicity        Right knee pain, unspecified chronicity        Generalized anxiety disorder        Special screening for malignant neoplasms, colon        Learning disability           Follow-ups after your visit        Follow-up notes from your care team     Return in about 2 months (around 1/19/2019).      Your next 10 appointments already scheduled     Dec 06, 2018  2:00 PM CST   Return Visit with Sharon Adame LP   [facility]    [address] [facility] [address] [phone]              Future tests that were ordered for you today     Open Future Orders        Priority Expected Expires Ordered    Fecal colorectal cancer screen (FIT) Routine 12/10/2018 2/11/2019 11/19/2018            Who to contact     If you have questions or need follow up information about today's clinic visit or your schedule please contact [facility] directly at [phone].  Normal or non-critical lab and imaging results will be communicated to you by MyChart, letter or phone within 4 business days after the clinic has received the results. If you do not hear from us within 7 days, please contact the clinic through MyChart or phone. If you have a critical or abnormal lab result, we will notify you by phone as soon as possible.  Submit refill requests through [employer] or call your pharmacy and they will forward the refill request to us. Please allow 3  "business days for your refill to be completed.          Additional Information About Your Visit        Care EveryWhere ID     This is your Care EveryWhere ID. This could be used by other organizations to access your [facility] medical records  [id number]        Your Vitals Were     Pulse Temperature Respirations Height Pulse Oximetry BMI (Body Mass Index)    71 98.7  F (37.1  C) (Oral) 16 5' 9\" (1.753 m) 98% 25.99 kg/m2       Blood Pressure from Last 3 Encounters:   11/19/18 139/84   10/24/18 133/84   10/02/18 150/90    Weight from Last 3 Encounters:   11/19/18 176 lb (79.8 kg)   10/24/18 179 lb (81.2 kg)   10/02/18 184 lb (83.5 kg)                 Today's Medication Changes          These changes are accurate as of 11/19/18 11:59 PM.  If you have any questions, ask your nurse or doctor.               Start taking these medicines.        Dose/Directions    * topiramate 25 MG tablet   Commonly known as:  TOPAMAX   Used for:  Hip pain, right, Midline low back pain without sciatica, unspecified chronicity   Started by:  Andres Leal MD        SIG 1 every day X 10 days then 2 every day X 10 days then switch   Quantity:  30 tablet   Refills:  0       * topiramate 100 MG tablet   Commonly known as:  TOPAMAX   Used for:  Hip pain, right, Midline low back pain without sciatica, unspecified chronicity   Started by:  Andres Leal MD        Dose:  100 mg   Take 1 tablet (100 mg) by mouth daily Use second   Quantity:  30 tablet   Refills:  2       * Notice:  This list has 2 medication(s) that are the same as other medications prescribed for you. Read the directions carefully, and ask your doctor or other care provider to review them with you.      These medicines have changed or have updated prescriptions.        Dose/Directions    buPROPion 300 MG 24 hr tablet   Commonly known as:  WELLBUTRIN XL   This may have changed:  Another medication with the same name was removed. Continue taking this medication, and follow the " directions you see here.   Used for:  Generalized anxiety disorder, Moderate episode of recurrent major depressive disorder (H)   Changed by:  Andres Leal MD        Dose:  300 mg   Take 1 tablet (300 mg) by mouth every morning   Quantity:  30 tablet   Refills:  2            Where to get your medicines      These medications were sent to [facility] [address]  [address] [facility] [address]     Phone:  [phone]     buPROPion 300 MG 24 hr tablet    nabumetone 750 MG tablet    topiramate 100 MG tablet    topiramate 25 MG tablet                Primary Care Provider Office Phone # Fax #    Andres Leal -[phone] [address] [facility] [address]        Goals        General    1: Financial Wellbeing (pt-stated)     Notes - Note created  7/26/2018 10:52 AM by Iman Dumont LSW    Goal Statement: I will obtain additional financial resources within 1-3 months to support my overall health and to obtain alternative housing.    Measure of Success: I will verbally report my updates to the care coordination team and PCP.   Supportive Steps to Achieve: I will outreach to my financial worker this week and inquire about cash assistance and have this worker send me the necessary forms to obtain additional support.  I will inquire about housing assistance as well when I talk with my financial worker.  I have agreed to outreach to the care coordination team after I talk with my financial worker if questions arise.   Barriers: I am currently residing with my son and may need to leave this residence in the future.    Strengths: I appear motivated to achieve additional financial supports and resources.    Date to Achieve By: 9/1/18        2: Transportation (pt-stated)     Notes - Note created  7/26/2018 10:56 AM by Iman Dumont LSW    Goal Statement: I will obtain rides through my insurance in 1-2 weeks for all upcoming medical  appointments to ensure I am able to follow up with providers as recommended.    Measure of Success: I will verbally update care team after I talk with insurance or if questions arise.    Supportive Steps to Achieve: [facility] the [facility] number to call today to line up medical rides. I was educated on this service today and reminded of the timeline to set up rides.  I can outreach to care coordination team if I have questions or concerns regarding transportation.   Barriers: Family rides are becoming difficult to line up, therefore I am looking for additional resources.    Strengths: I appear motivated to attend all of my medical and MH appointments.    Date to Achieve By: 8/15/18        Psychosocial (pt-stated)     Notes - Note created  11/8/2018  2:56 PM by Leonora Dougherty LGSW    Goal Statement: I will be living somewhere better.  Measure of Success: Complete an assessment for [facility].  Supportive Steps to Achieve: Work with Care Coordination, maintain appointments with providers, practice coping skills, maintain medication compliance.  Barriers: Homelessness, financial insecurity.  Strengths: Motivated and determined, engaged in Care Coordination.  Date to Achieve By: 12/8/18  Patient expressed understanding of goal: Pt reports understanding and denies any additional questions or concerns at this times. [facility] engaged in AIDET communication during encounter.        Psychosocial (pt-stated)     Notes - Note created  11/8/2018  2:58 PM by Leonora Dougherty LGSW    Goal Statement: I will have better income and resources..  Measure of Success: Be referred to SMRT.  Supportive Steps to Achieve: Work with providers and care coordinator to facilitate timely referral.  Barriers: Homelessness, financial insecurity.  Strengths: Motivated and determined.  Date to Achieve By: 12/8/18  Patient expressed understanding of goal: Pt reports understanding and denies any additional questions or concerns  at this times. [facility] engaged in AIDET communication during encounter.          Equal Access to Services     RANDA ORTIZ : Hadii aad ku hadjarrodsamreen Bradshaw, waleonilada len, qalionelmya alexandreeduardamac florez. So [facility] [phone].    ATENCIÓN: Si habla español, tiene a navarrete disposición servicios gratuitos de asistencia lingüística. Llame al [phone].    We comply with applicable federal civil rights laws and [place] laws. We do not discriminate on the basis of race, color, national origin, age, disability, sex, sexual orientation, or gender identity.            Thank you!     Thank you for choosing [facility]  for your care. Our goal is always to provide you with excellent care. Hearing back from our patients is one way we can continue to improve our services. Please take a few minutes to complete the written survey that you may receive in the mail after your visit with us. Thank you!             Your Updated Medication List - Protect others around you: Learn how to safely use, store and throw away your medicines at www.disposemymeds.org.          This list is accurate as of 11/19/18 11:59 PM.  Always use your most recent med list.                   Brand Name Dispense Instructions for use Diagnosis    buPROPion 300 MG 24 hr tablet    WELLBUTRIN XL    30 tablet    Take 1 tablet (300 mg) by mouth every morning    Generalized anxiety disorder, Moderate episode of recurrent major depressive disorder (H)       IBUPROFEN PO      Take 200 mg by mouth every 4 hours as needed for moderate pain (Over 1200mg a day)        nabumetone 750 MG tablet    RELAFEN    30 tablet    Take 1 tablet (750 mg) by mouth daily    Cervicalgia, Left knee pain, unspecified chronicity, Right knee pain, unspecified chronicity       * topiramate 25 MG tablet    TOPAMAX    30 tablet    SIG 1 every day X 10 days then 2 every day X 10 days then switch    Hip pain, right, Midline low back pain  without sciatica, unspecified chronicity       * topiramate 100 MG tablet    TOPAMAX    30 tablet    Take 1 tablet (100 mg) by mouth daily Use second    Hip pain, right, Midline low back pain without sciatica, unspecified chronicity       * Notice:  This list has 2 medication(s) that are the same as other medications prescribed for you. Read the directions carefully, and ask your doctor or other care provider to review them with you.

## 2018-11-19 NOTE — LETTER
[place] CARE COORDINATION  [address]  [facility] [address]  November 19, 2018    Bhavin Awad  [address] [facility] [address]      Dear Bhavin,    I am a clinic care coordinator who works with Andres Leal MD at [facility]. I want to provide you with my contact information so that you can call me with questions or concerns about your health care. Below is a description of clinic care coordination and how I can further assist you.     The clinic care coordinator is a registered nurse and/or  who understand the health care system. The goal of clinic care coordination is to help you manage your health and improve access to the [facility] system in the most efficient manner. The registered nurse can assist you in meeting your health care goals by providing education, coordinating services, and strengthening the communication among your providers. The  can assist you with financial, behavioral, psychosocial, chemical dependency, counseling, and/or psychiatric resources.    Please feel free to contact me at [phone], with any questions or concerns. I will be available through November 30, 2018 - after that you can call your clinic at [phone], ask for Care Coordination, and they will be able to help you out. We at [facility] are focused on providing you with the highest-quality healthcare experience possible and that all starts with you.     Sincerely,     Leonora Dougherty    Enclosed: I have enclosed a copy of the Complex Care Plan. This has helpful information and goals that we have talked about. Please keep this in an easy to access place to use as needed.

## 2018-11-23 ENCOUNTER — PATIENT OUTREACH (OUTPATIENT)
Dept: CARE COORDINATION | Facility: CLINIC | Age: 58
End: 2018-11-23

## 2018-11-23 ASSESSMENT — ACTIVITIES OF DAILY LIVING (ADL): DEPENDENT_IADLS:: TRANSPORTATION;INDEPENDENT

## 2018-11-23 NOTE — PROGRESS NOTES
Clinic Care Coordination Contact    Clinic Care Coordination Contact  OUTREACH    Referral Information:  Referral Source: Care Team (Dr. Leal )    Primary Diagnosis: Psychosocial    Chief Complaint   Patient presents with     Clinic Care Coordination - Follow-up        [place] Utilization:   Clinic Utilization  Difficulty keeping appointments:: No  Compliance Concerns: No  No-Show Concerns: No  No PCP office visit in Past Year: No  Utilization    Last refreshed: 11/20/2018  2:53 PM:  No Show Count (past year) 1       Last refreshed: 11/20/2018  2:53 PM:  ED visits 1       Last refreshed: 11/20/2018  2:53 PM:  Hospital admissions 0          Current as of: 11/20/2018  2:53 PM             Clinical Concerns:  Current Medical Concerns:  Depression and chronic pain.    Current Behavioral Concerns: Difficulty navigating needs due to barriers including homelessness.    Education Provided to patient: SWCC role reviewed.    Pain  Pain (GOAL):: No  Health Maintenance Reviewed:      SWCC received VM from patient left at 8:30am this morning, stating he was not going to come to clinic to meet with SWCC because he is going to use his son's phone to call Social Security. He also stated he plans to meet with SWCC and RNCC Monday but can't remember time. SWCC called patient and arranged for meeting at 1:30pm on Monday at [facility].      Functional Status:  Dependent ADLs:: Ambulation-no assistive device  Dependent IADLs:: Transportation, Independent  Bed or wheelchair confined:: No  Mobility Status: Independent    Living Situation:  Current living arrangement:: I live alone (currently living with son and daughter in law and grandchildren - they would like him out of the house by end of summer )  Type of residence:: Homeless    Diet/Exercise/Sleep:  Diet:: Regular  Inadequate nutrition (GOAL):: No  Food Insecurity: No  Tube Feeding: No  Exercise:: Yes  Inadequate activity/exercise (GOAL):: No  Significant changes in sleep pattern  (GOAL): No    Transportation:  Transportation concerns (GOAL):: Yes  Transportation means:: Family, Medical transport     Psychosocial:  Mental health DX:: Yes (patient does not know his diagnosis - has been 10 years since he last saw mental health )  Mental health DX how managed:: None  Mental health management concern (GOAL):: Yes  Informal Support system:: Children     Financial/Insurance:   Financial/Insurance concerns (GOAL):: Yes     Resources and Interventions:  Community Resources: [facility] Programs ([employer] benefits and MA )    CC received VM from Lainey at [facility] re: coordinating SMRT referral for patient. CC will outreach to Lainey.    Supplies used at home:: None  Equipment Currently Used at Home: none    Advance Care Plan/Directive  Advanced Care Plans/Directives on file:: No    Referrals Placed: Behavioral Health Providers, Disability Linkage line (CCRN asked provider to order BHP )     Goals:   Goals        General    1: Financial Wellbeing (pt-stated)     Notes - Note created  7/26/2018 10:52 AM by Iman Dumont LSW    Goal Statement: I will obtain additional financial resources within 1-3 months to support my overall health and to obtain alternative housing.    Measure of Success: I will verbally report my updates to the care coordination team and PCP.   Supportive Steps to Achieve: I will outreach to my financial worker this week and inquire about cash assistance and have this worker send me the necessary forms to obtain additional support.  I will inquire about housing assistance as well when I talk with my financial worker.  I have agreed to outreach to the care coordination team after I talk with my financial worker if questions arise.   Barriers: I am currently residing with my son and may need to leave this residence in the future.    Strengths: I appear motivated to achieve additional financial supports and resources.    Date to Achieve By: 9/1/18        2: Transportation (pt-stated)      Notes - Note created  7/26/2018 10:56 AM by Iman Dumont LSW    Goal Statement: I will obtain rides through my insurance in 1-2 weeks for all upcoming medical appointments to ensure I am able to follow up with providers as recommended.    Measure of Success: I will verbally update care team after I talk with insurance or if questions arise.    Supportive Steps to Achieve: [facility] provided me with the [employer] [place] number to call today to line up medical rides. I was educated on this service today and reminded of the timeline to set up rides.  I can outreach to care coordination team if I have questions or concerns regarding transportation.   Barriers: Family rides are becoming difficult to line up, therefore I am looking for additional resources.    Strengths: I appear motivated to attend all of my medical and MH appointments.    Date to Achieve By: 8/15/18        Psychosocial (pt-stated)     Notes - Note created  11/8/2018  2:56 PM by Leonora Dougherty LGSW    Goal Statement: I will be living somewhere better.  Measure of Success: Complete an assessment for [facility].  Supportive Steps to Achieve: Work with Care Coordination, maintain appointments with providers, practice coping skills, maintain medication compliance.  Barriers: Homelessness, financial insecurity.  Strengths: Motivated and determined, engaged in Care Coordination.  Date to Achieve By: 12/8/18  Patient expressed understanding of goal: Pt reports understanding and denies any additional questions or concerns at this times. [facility] engaged in AIDET communication during encounter.        Psychosocial (pt-stated)     Notes - Note created  11/8/2018  2:58 PM by Leonora Dougherty LGSW    Goal Statement: I will have better income and resources..  Measure of Success: Be referred to SMRT.  Supportive Steps to Achieve: Work with providers and care coordinator to facilitate timely referral.  Barriers: Homelessness, financial insecurity.  Strengths:  Motivated and determined.  Date to Achieve By: 12/8/18  Patient expressed understanding of goal: Pt reports understanding and denies any additional questions or concerns at this times. SW CC engaged in AIDET communication during encounter.                Patient/Caregiver understanding: Pt reports understanding and denies any additional questions or concerns at this times. SW CC engaged in AIDET communication during encounter.    Outreach Frequency: weekly  Future Appointments              In 1 week Sharon Adame LP [facility], [facility]          Plan: SWCC and RNCC will meet with patient in clinic Monday. RNCC and patient will be informed of SWCC contact with Lainey re: patient financial resources (SMRT, in particular). Patient's goals and needs will also be reviewed during this meeting.    CASI Hall  Clinic Care Coordinator - [facility] and [facility]  Ph: [phone]  [email or web address]  (Today's date: 11/23/18)

## 2018-11-26 ENCOUNTER — PATIENT OUTREACH (OUTPATIENT)
Dept: CARE COORDINATION | Facility: CLINIC | Age: 58
End: 2018-11-26

## 2018-11-26 DIAGNOSIS — Z12.11 SPECIAL SCREENING FOR MALIGNANT NEOPLASMS, COLON: ICD-10-CM

## 2018-11-26 LAB — HEMOCCULT STL QL IA: NEGATIVE

## 2018-11-26 PROCEDURE — 82274 ASSAY TEST FOR BLOOD FECAL: CPT | Performed by: FAMILY MEDICINE

## 2018-11-26 NOTE — LETTER
November 27, 2018      Bhavin Awad  [address] [facility] [address]        Dear ,    Colon cancer screen is normal. GREAT!     Resulted Orders   Fecal colorectal cancer screen (FIT)   Result Value Ref Range    Occult Blood Scn FIT Negative NEG^Negative       If you have any questions or concerns, please call the clinic at the number listed above.       Sincerely,        Andres Leal MD

## 2018-11-27 ENCOUNTER — PATIENT OUTREACH (OUTPATIENT)
Dept: CARE COORDINATION | Facility: CLINIC | Age: 58
End: 2018-11-27

## 2018-11-27 ASSESSMENT — ACTIVITIES OF DAILY LIVING (ADL): DEPENDENT_IADLS:: TRANSPORTATION;INDEPENDENT

## 2018-11-27 NOTE — PROGRESS NOTES
Clinic Care Coordination Contact    Situation: Patient chart reviewed by care coordinator.    Background: Lainey had contacted [facility] last week re: needing CHANTEL and information on services needed for patient. CHANTEL was obtained and faxed to number provided by Lainey.     Assessment:  [facility] called Lainey, patient's Financial Worker, to follow up on SMRT referral.     Plan: [facility] will be available to work with Lainey and patient on getting SMRT referral started.      Leonora Dougherty [facility]  Clinic Care Coordinator - [facility], and [facility]  Ph: [phone]  [email or web address]  (Today's date: 11/27/18)

## 2018-11-27 NOTE — PROGRESS NOTES
Clinic Care Coordination Contact    Clinic Care Coordination Contact  OUTREACH    Referral Information:  Referral Source: Care Team (Dr. Leal )    Primary Diagnosis: Psychosocial    Chief Complaint   Patient presents with     Clinic Care Coordination - Follow-up        [place] Utilization:   Clinic Utilization  Difficulty keeping appointments:: No  Compliance Concerns: No  No-Show Concerns: No  No PCP office visit in Past Year: No  Utilization    Last refreshed: 11/26/2018  9:20 PM:  No Show Count (past year) 1       Last refreshed: 11/26/2018  9:20 PM:  ED visits 1       Last refreshed: 11/26/2018  9:20 PM:  Hospital admissions 0          Current as of: 11/26/2018  9:20 PM             Clinical Concerns:  Current Medical Concerns:  Depression and anxiety, chronic pain    Current Behavioral Concerns: Frustration over staying at shelter is barrier to accessing needed resources including financial and alternative housing.  Education Provided to patient: SW/RN CC role reviewed. Problem solving modeled.      [facility] met with patient in clinic to review plan and progress with goals. Patient reports social security was closed Friday so he was not able to confirm work history details in order to finish paperwork they sent. He stated he has informed Esthela, the social security person assisting him, that he was not able to complete by deadline and to please contact him asap. Patient no longer wants to pursue housing assessment via [facility] because he reports he has already done it and is not interested in resources obtained through it, mainly because they are all shelters where you have to leave during the day. He does plan to go to [place] to access help with ID, Birth Certificate, and eventually Social Security cared. He confirmed he still has the information about [place] that [facility] provided. Patient also reports he plans to look into [facility] housing resources. He states he is already on the list  for [facility] in [facility]. He agreed today to SWCC contacting Lainey, his financial worker at [facility], in order to start SMRT referral. He also agreed to consulting Lainey about any housing resources she is aware of including [facility] in order to access [facility]. Patient is aware that SWCC is transitioning out of role at end of week and his point person will be RNCC for AV clinic. RNCC met with patient and SWCC briefly to introduce self and review goals. RNCC also had suggestions for things SWCC and patient could do today. SWCC and patient problem solved and decided that SWCC would reach out to Lainey re: SMRT and housing, and patient would look into housing resources in [facility] as well - he did not want to call [facility] together today but, rather, preferred CC follow up on own with this. He also did not want to spend time with [facility] today on calling [facility] for housing resources, saying he can do that on his own. CHANTEL for Lainey, financial worker at [facility], was signed.    Pain  Pain (GOAL):: No - did not report any today, although has reported chronic pain is barrier to working.  Health Maintenance Reviewed:    Medication Management:  Patient feels his current psychotropic, Buspar, is not helping.      Functional Status:  Dependent ADLs:: Ambulation-no assistive device  Dependent IADLs:: Transportation, Independent  Bed or wheelchair confined:: No  Mobility Status: Independent    Living Situation:  Current living arrangement:: I live alone (currently living with son and daughter in law and grandchildren - they would like him out of the house by end of summer )  Type of residence:: Homeless    Diet/Exercise/Sleep:  Diet:: Regular  Inadequate nutrition (GOAL):: No  Food Insecurity: No  Tube Feeding: No  Exercise:: Yes  Inadequate activity/exercise (GOAL):: No  Significant changes in sleep pattern (GOAL): No    Transportation:  Transportation concerns (GOAL):: Yes  Transportation means::  Family, Medical transport     Psychosocial:  Mental health DX:: Yes (patient does not know his diagnosis - has been 10 years since he last saw mental health )  Mental health DX how managed:: None  Mental health management concern (GOAL):: Yes  Informal Support system:: Children     Financial/Insurance:   Financial/Insurance concerns (GOAL):: Yes  In process of accessing disability.     Resources and Interventions:    Community Resources: [place] Programs ([employer] food benefits and [place] )  Supplies used at home:: None  Equipment Currently Used at Home: none    Advance Care Plan/Directive  Advanced Care Plans/Directives on file:: No    Referrals Placed: Behavioral Health Providers, Disability Linkage line (CCRN asked provider to order BHP )     Goals:   Goals        General    1: Financial Wellbeing (pt-stated)     Notes - Note created  7/26/2018 10:52 AM by Iman Dumont LSW    Goal Statement: I will obtain additional financial resources within 1-3 months to support my overall health and to obtain alternative housing.    Measure of Success: I will verbally report my updates to the care coordination team and PCP.   Supportive Steps to Achieve: I will outreach to my financial worker this week and inquire about cash assistance and have this worker send me the necessary forms to obtain additional support.  I will inquire about housing assistance as well when I talk with my financial worker.  I have agreed to outreach to the care coordination team after I talk with my financial worker if questions arise.   Barriers: I am currently residing with my son and may need to leave this residence in the future.    Strengths: I appear motivated to achieve additional financial supports and resources.    Date to Achieve By: 9/1/18        2: Transportation (pt-stated)     Notes - Note created  7/26/2018 10:56 AM by Iman Dumont LSW    Goal Statement: I will obtain rides through my insurance in 1-2 weeks for all upcoming medical  appointments to ensure I am able to follow up with providers as recommended.    Measure of Success: I will verbally update care team after I talk with insurance or if questions arise.    Supportive Steps to Achieve: [facility] provided me with the [employer] number to call today to line up medical rides. I was educated on this service today and reminded of the timeline to set up rides.  I can outreach to care coordination team if I have questions or concerns regarding transportation.   Barriers: Family rides are becoming difficult to line up, therefore I am looking for additional resources.    Strengths: I appear motivated to attend all of my medical and MH appointments.    Date to Achieve By: 8/15/18        Psychosocial (pt-stated)     Notes - Note created  11/8/2018  2:56 PM by Leonora Dougherty LGSW    Goal Statement: I will be living somewhere better.  Measure of Success: Complete an assessment for [facility].  Supportive Steps to Achieve: Work with Care Coordination, maintain appointments with providers, practice coping skills, maintain medication compliance.  Barriers: Homelessness, financial insecurity.  Strengths: Motivated and determined, engaged in Care Coordination.  Date to Achieve By: 12/8/18  Patient expressed understanding of goal: Pt reports understanding and denies any additional questions or concerns at this times. [facility] engaged in AIDET communication during encounter.        Psychosocial (pt-stated)     Notes - Note created  11/8/2018  2:58 PM by Leonora Dougherty LGSW    Goal Statement: I will have better income and resources..  Measure of Success: Be referred to SMRT.  Supportive Steps to Achieve: Work with providers and care coordinator to facilitate timely referral.  Barriers: Homelessness, financial insecurity.  Strengths: Motivated and determined.  Date to Achieve By: 12/8/18  Patient expressed understanding of goal: Pt reports understanding and denies any additional questions or concerns  at this times. SW CC engaged in AIDET communication during encounter.                Patient/Caregiver understanding: Pt reports understanding and denies any additional questions or concerns at this times. SW CC engaged in AIDET communication during encounter.    Outreach Frequency: weekly  Future Appointments              In 1 week Sharon Adame LP [facility]          Plan: SWCC will fax CHANTEL to Lainey and call to discuss services patient might access if SMRT approves disability. Patient is going to go to [place], be available for Esthela from social security to return his call, look into housing resources at [facility], and maintain contact with RNCC.    Leonora Dougherty, [facility]  Clinic Care Coordinator - [facility], and [facility]  Ph: [phone]  [email or web address]  (Today's date: 11/27/18)

## 2018-11-30 ENCOUNTER — PATIENT OUTREACH (OUTPATIENT)
Dept: CARE COORDINATION | Facility: CLINIC | Age: 58
End: 2018-11-30

## 2018-11-30 ASSESSMENT — ACTIVITIES OF DAILY LIVING (ADL): DEPENDENT_IADLS:: TRANSPORTATION;INDEPENDENT

## 2018-11-30 NOTE — PROGRESS NOTES
Clinic Care Coordination Contact    Clinic Care Coordination Contact  OUTREACH    Referral Information:  Referral Source: Care Team (Dr. Leal )    Primary Diagnosis: Psychosocial    Chief Complaint   Patient presents with     Clinic Care Coordination - Follow-up        [place] Utilization:    Clinic Utilization  Difficulty keeping appointments:: No  Compliance Concerns: No  No-Show Concerns: No  No PCP office visit in Past Year: No  Utilization    Last refreshed: 11/27/2018  2:47 PM:  No Show Count (past year) 1       Last refreshed: 11/27/2018  2:47 PM:  ED visits 1       Last refreshed: 11/27/2018  2:47 PM:  Hospital admissions 0          Current as of: 11/27/2018  2:47 PM             Clinical Concerns:  Current Medical Concerns:  Depression and Anxiety    Current Behavioral Concerns: Difficulty accessing housing resources due to frustration.    Education Provided to patient: [facility] role reviewed.   Pain  Pain (GOAL):: No  Health Maintenance Reviewed:      [facility] called patient for update and to offer assistance and overall support. [facility] then called Lainey ([phone]) , patient's financial worker, to confirm receipt of faxed CHANTEL and to discuss services patient might access via SMRT referral/approval. [facility] also is calling Lainey to provide contact info for covering SW after this SWCC transitions out of role.    Functional Status:  Dependent ADLs:: Ambulation-no assistive device  Dependent IADLs:: Transportation, Independent  Bed or wheelchair confined:: No  Mobility Status: Independent    Living Situation:  Current living arrangement:: I live alone (currently living with son and daughter in law and grandchildren - they would like him out of the house by end of summer )  Type of residence:: Homeless    Diet/Exercise/Sleep:  Diet:: Regular  Inadequate nutrition (GOAL):: No  Food Insecurity: No  Tube Feeding: No  Exercise:: Yes  Inadequate activity/exercise (GOAL):: No  Significant changes in sleep pattern (GOAL):  No    Transportation:  Transportation concerns (GOAL):: Yes  Transportation means:: Family, Medical transport     Psychosocial:  Mental health DX:: Yes (patient does not know his diagnosis - has been 10 years since he last saw mental health )  Mental health DX how managed:: None  Mental health management concern (GOAL):: Yes  Informal Support system:: Children     Financial/Insurance:   Financial/Insurance concerns (GOAL):: Yes - patient gets GA only     Resources and Interventions:  Current Resources:    ;   Community Resources: [place] Programs ([employer] food benefits and [place] )  Supplies used at home:: None  Equipment Currently Used at Home: none    Advance Care Plan/Directive  Advanced Care Plans/Directives on file:: No    Referrals Placed: Behavioral Health Providers, Disability Linkage line (CCRN asked provider to order BHP )     Goals:   Goals        General    1: Financial Wellbeing (pt-stated)     Notes - Note created  7/26/2018 10:52 AM by Iman Dumont LSW    Goal Statement: I will obtain additional financial resources within 1-3 months to support my overall health and to obtain alternative housing.    Measure of Success: I will verbally report my updates to the care coordination team and PCP.   Supportive Steps to Achieve: I will outreach to my financial worker this week and inquire about cash assistance and have this worker send me the necessary forms to obtain additional support.  I will inquire about housing assistance as well when I talk with my financial worker.  I have agreed to outreach to the care coordination team after I talk with my financial worker if questions arise.   Barriers: I am currently residing with my son and may need to leave this residence in the future.    Strengths: I appear motivated to achieve additional financial supports and resources.    Date to Achieve By: 9/1/18        2: Transportation (pt-stated)     Notes - Note created  7/26/2018 10:56 AM by Iman Dumont LSW    Goal  Statement: I will obtain rides through my insurance in 1-2 weeks for all upcoming medical appointments to ensure I am able to follow up with providers as recommended.    Measure of Success: I will verbally update care team after I talk with insurance or if questions arise.    Supportive Steps to Achieve: [facility] provided me with the [employer] number to call today to line up medical rides. I was educated on this service today and reminded of the timeline to set up rides.  I can outreach to care coordination team if I have questions or concerns regarding transportation.   Barriers: Family rides are becoming difficult to line up, therefore I am looking for additional resources.    Strengths: I appear motivated to attend all of my medical and MH appointments.    Date to Achieve By: 8/15/18        Psychosocial (pt-stated)     Notes - Note created  11/8/2018  2:56 PM by Leonora Dougherty LGSW    Goal Statement: I will be living somewhere better.  Measure of Success: Complete an assessment for [facility].  Supportive Steps to Achieve: Work with Care Coordination, maintain appointments with providers, practice coping skills, maintain medication compliance.  Barriers: Homelessness, financial insecurity.  Strengths: Motivated and determined, engaged in Care Coordination.  Date to Achieve By: 12/8/18  Patient expressed understanding of goal: Pt reports understanding and denies any additional questions or concerns at this times. [facility] engaged in AIDET communication during encounter.        Psychosocial (pt-stated)     Notes - Note created  11/8/2018  2:58 PM by Leonora Dougherty LGSW    Goal Statement: I will have better income and resources..  Measure of Success: Be referred to SMRT.  Supportive Steps to Achieve: Work with providers and care coordinator to facilitate timely referral.  Barriers: Homelessness, financial insecurity.  Strengths: Motivated and determined.  Date to Achieve By: 12/8/18  Patient expressed  understanding of goal: Pt reports understanding and denies any additional questions or concerns at this times. SW CC engaged in AIDET communication during encounter.                Patient/Caregiver understanding: Pt reports understanding and denies any additional questions or concerns at this times. SW CC engaged in AIDET communication during encounter.    Outreach Frequency: weekly  Future Appointments              In 6 days Sharon Adame LP [facility], [facility]          Plan: Patient stated the line was too line at [facility] so he plans to go back next week at an earlier time. He also plans to call to get on [facility] list next month when get more minutes on phone. Patient understands that he can call his clinic and ask for Care Coordination at any time. Patient is prepared for outreach on Monday 12/10/18, but it is clinically appropriate for a sooner outreach so that patient feels supported by [facility]. [facility] outreach will be scheduled for next week so that patient has contact info for covering [facility] and support with f/u with [facility] (ID, birth certificate, and Social Security card), SMRT process (financial worker, Lainey, has been notified of need and CHANTEL has been faxed to her), mental health and goals in general.    Leonora Dougherty [facility]  Clinic Care Coordinator - [facility], and [facility]  Ph: [phone]  [email or web address]  (Today's date: 11/30/18)      Update: [facility] received a call from Lainey at [facility]. Apparently CHANTEL fax did not go through clearly, so [facility] re-faxed and then sent CHANTEL to [facility]. [facility] also provided covering CC contact info (Kiki Valdez).

## 2018-12-06 ENCOUNTER — OFFICE VISIT (OUTPATIENT)
Dept: PSYCHOLOGY | Facility: CLINIC | Age: 58
End: 2018-12-06
Payer: COMMERCIAL

## 2018-12-06 DIAGNOSIS — F41.1 GENERALIZED ANXIETY DISORDER: ICD-10-CM

## 2018-12-06 DIAGNOSIS — F33.1 MODERATE EPISODE OF RECURRENT MAJOR DEPRESSIVE DISORDER (H): Primary | ICD-10-CM

## 2018-12-06 PROCEDURE — 90834 PSYTX W PT 45 MINUTES: CPT | Performed by: PSYCHOLOGIST

## 2018-12-06 ASSESSMENT — ANXIETY QUESTIONNAIRES
7. FEELING AFRAID AS IF SOMETHING AWFUL MIGHT HAPPEN: NEARLY EVERY DAY
2. NOT BEING ABLE TO STOP OR CONTROL WORRYING: MORE THAN HALF THE DAYS
6. BECOMING EASILY ANNOYED OR IRRITABLE: NEARLY EVERY DAY
1. FEELING NERVOUS, ANXIOUS, OR ON EDGE: MORE THAN HALF THE DAYS
GAD7 TOTAL SCORE: 16
IF YOU CHECKED OFF ANY PROBLEMS ON THIS QUESTIONNAIRE, HOW DIFFICULT HAVE THESE PROBLEMS MADE IT FOR YOU TO DO YOUR WORK, TAKE CARE OF THINGS AT HOME, OR GET ALONG WITH OTHER PEOPLE: VERY DIFFICULT
5. BEING SO RESTLESS THAT IT IS HARD TO SIT STILL: MORE THAN HALF THE DAYS
3. WORRYING TOO MUCH ABOUT DIFFERENT THINGS: MORE THAN HALF THE DAYS

## 2018-12-06 ASSESSMENT — PATIENT HEALTH QUESTIONNAIRE - PHQ9
SUM OF ALL RESPONSES TO PHQ QUESTIONS 1-9: 18
5. POOR APPETITE OR OVEREATING: MORE THAN HALF THE DAYS

## 2018-12-06 NOTE — PROGRESS NOTES
"                                             Progress Note    Client Name: Bhavin Awad  Date: 12-06-18         Service Type: Individual      Session Start Time: 2:05  Session End Time: 3:00      Session Length: 38- 52  min     Session #: 6     Attendees: Client    Treatment Plan Last Reviewed: today  PHQ-9 / JOHNNIE-7 : see flow sheets     DATA      Progress Since Last Session (Related to Symptoms / Goals / Homework):   Symptoms: no change overall stable but with anxiety, stress, sleep, appetite, energy  disruption, feeling bad about self, mood disruption    Homework: Completed in session      Episode of Care Goals: Satisfactory progress - PREPARATION (Decided to change - considering how); Intervened by negotiating a change plan and determining options / strategies for behavior change, identifying triggers, exploring social supports, and working towards setting a date to begin behavior change.    Is working with Lainey with [facility].   Disability services- will be filling out an application.    Has help from [place] [address]. [phone] Disability Linkage Line.  Goals: 1) manage pain, 2) get his own place 3) money for the dl.     Current / Ongoing Stressors and Concerns:   Housing and mood disruption   Family discourse - son and daughter -in -law and \"kicked him out\" of the house after an argument on  parenting   Concerned with daughter in law's drinking     Treatment Objective(s) Addressed in This Session:   Support     Intervention:     Reviewed symptom, stressors, skills used since last visit.    Client reports updates:  Continues on medication.    Client shares more history.  Is concerned that he has a mental problem.  References a medical event  around when he was 4 months old.  Held back in 1st grade.  In special education through out life.  Mom had depression and had a learning disablity.   Dad was not a \"socialble person\".    Client shared that he has lost support system ( wives).  2002 last " "divorce.  2015 last job.    Oldest daughter did respond to his communication.  DIi spend time with son over night. Didn't sleep while there.  \"It was just weird\".    Remains on SNAP, has some limited money through [place]  Completed phone appointment with SSDI.   Remains in 24 hour housing in [facility] ( [address] and [address]).      Disappointed that Juani from  care coordination has moved on to a   different job, had made progress on   [employer] application.    Processed emotions.        ASSESSMENT: Current Emotional / Mental Status (status of significant symptoms):   Risk status (Self / Other harm or suicidal ideation)   Client denies current fears or concerns for personal safety.   Client denies current or recent suicidal ideation or behaviors.   Client denies current or recent homicidal ideation or behaviors.   Client denies current or recent self injurious behavior or ideation.   Client denies other safety concerns.   Client Client reports there has been a change in risk factors since their last session.  using a homeless shelter   Client Client reports there has been no change in protective factors since their last session.     A safety and risk management plan has not been developed at this time, however client was given the after-hours number / 911 should there be a change in any of these risk factors.     Appearance:   Appropriate    Eye Contact:   Good    Psychomotor Behavior: Normal    Attitude:   Cooperative    Orientation:   All   Speech   Rate / Production: Normal    volume:  Normal    Mood:    Anxious  Depressed  Normal   Affect:    Appropriate    Thought Content:  Clear    Thought Form:  Coherent  Logical    Insight:    Good      Medication Review:   No current psychiatric medications prescribed     Medication Compliance:   NA     Changes in Health Issues:   None reported     Chemical Use Review:   Substance Use: Chemical use reviewed, no active concerns identified      Tobacco Use: No " "current tobacco use.       Collateral Reports Completed:   Routed note to PCP as needed.    PLAN: (Client Tasks / Therapist Tasks / Other)   1) daily problem solving steps - food /sheltr  2) manage pain, 3) get his own place 4) money for the 's license.  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    Anticipated number of session or this episode of care: 12      MeasurableTreatment Goal(s) related to diagnosis / functional impairment(s)  Goal 1: Client will attend to  problem solving daily. Connect with social workers timely.    I will know I've met my goal when .  stable housing / complete papers enroll in programs to help himself.    Objective #A (Client Action)    Client will compile a list of boundaries that they would like to set with others. Increase diplomacy.  Status: New - Date: 10-02-18     Intervention(s)  Therapist will assit with problem solving.    MeasurableTreatment Goal(s) related to diagnosis / functional impairment(s)  Goal 2:  Client will begin to understand barriers and participate in action steps for a more stable future.  Objective #A (Client Action)    Client will identify triggers.ex \"I tell it like it is\"  .  Ex  All or none thinking which contributes to anxiety .    Status: New - Date: 10-08-18     Intervention(s)  Therapist will assit with teaching skills to manage triggers, move to problem solving.      Client has reviewed " and agreed to the above plan.      Sharon Adame LP  October 2, 2018

## 2018-12-06 NOTE — MR AVS SNAPSHOT
MRN:[id number]                      After Visit Summary   12/6/2018    Bhavin Awad    MRN: [id number]           Visit Information        Provider Department      12/6/2018 2:00 PM Sharon Adame LP [facility]        Your next 10 appointments already scheduled     Feb 01, 2019 11:00 AM CST   Return Visit with Sharon Adame LP   [facility] ([facility])    [address]  [facility] [id number] [phone]              Care EveryWhere ID     This is your Care EveryWhere ID. This could be used by other organizations to access your [place] medical records  [id number]        Equal Access to Services     RANDA ORTIZ : Hadii renato Bradshaw, mary harrington, marcelino houser, mac restrepo. So [facility] [phone].    ATENCIÓN: Si habla español, tiene a navarrete disposición servicios gratuitos de asistencia lingüística. [facility] [phone].    We comply with applicable federal civil rights laws and [place] laws. We do not discriminate on the basis of race, color, national origin, age, disability, sex, sexual orientation, or gender identity.

## 2018-12-07 ASSESSMENT — ANXIETY QUESTIONNAIRES: GAD7 TOTAL SCORE: 16

## 2018-12-17 ENCOUNTER — PATIENT OUTREACH (OUTPATIENT)
Dept: CARE COORDINATION | Facility: CLINIC | Age: 58
End: 2018-12-17

## 2018-12-17 NOTE — PROGRESS NOTES
Clinic Care Coordination Contact  SW CC Follow-Up    Call placed to Patient. Introduced role and follow-up from previous SW CC. Patient expresses understanding, however, quickly reports that he cannot talk for long d/t running out of minutes on his phone. Pt is interested in meeting in-person, however, Pt is unable to meet with this worker at the [facility]. Patient prefers to meet with the Clinic SW in [place] next month and to not use his phone minutes.    Plan: MANOLO CC will update the [facility] CC for follow-up in one month.     Alise Manuel, [facility]  Clinic Care Coordinator  Ph. [phone]  [email or web address]

## 2019-01-22 ENCOUNTER — PATIENT OUTREACH (OUTPATIENT)
Dept: CARE COORDINATION | Facility: CLINIC | Age: 59
End: 2019-01-22

## 2019-01-22 ENCOUNTER — OFFICE VISIT (OUTPATIENT)
Dept: FAMILY MEDICINE | Facility: CLINIC | Age: 59
End: 2019-01-22
Payer: COMMERCIAL

## 2019-01-22 VITALS
OXYGEN SATURATION: 99 % | RESPIRATION RATE: 14 BRPM | HEIGHT: 69 IN | WEIGHT: 173 LBS | BODY MASS INDEX: 25.62 KG/M2 | DIASTOLIC BLOOD PRESSURE: 78 MMHG | HEART RATE: 69 BPM | SYSTOLIC BLOOD PRESSURE: 124 MMHG | TEMPERATURE: 98 F

## 2019-01-22 DIAGNOSIS — F41.1 GENERALIZED ANXIETY DISORDER: ICD-10-CM

## 2019-01-22 DIAGNOSIS — M25.551 HIP PAIN, RIGHT: ICD-10-CM

## 2019-01-22 DIAGNOSIS — M25.561 RIGHT KNEE PAIN, UNSPECIFIED CHRONICITY: ICD-10-CM

## 2019-01-22 DIAGNOSIS — F33.1 MODERATE EPISODE OF RECURRENT MAJOR DEPRESSIVE DISORDER (H): ICD-10-CM

## 2019-01-22 DIAGNOSIS — M54.2 CERVICALGIA: ICD-10-CM

## 2019-01-22 DIAGNOSIS — M25.562 LEFT KNEE PAIN, UNSPECIFIED CHRONICITY: ICD-10-CM

## 2019-01-22 DIAGNOSIS — M54.41 MIDLINE LOW BACK PAIN WITH RIGHT-SIDED SCIATICA, UNSPECIFIED CHRONICITY: ICD-10-CM

## 2019-01-22 LAB
ALBUMIN UR-MCNC: NEGATIVE MG/DL
APPEARANCE UR: CLEAR
BASOPHILS # BLD AUTO: 0 10E9/L (ref 0–0.2)
BASOPHILS NFR BLD AUTO: 0.4 %
BILIRUB UR QL STRIP: NEGATIVE
COLOR UR AUTO: YELLOW
DIFFERENTIAL METHOD BLD: NORMAL
EOSINOPHIL # BLD AUTO: 0.1 10E9/L (ref 0–0.7)
EOSINOPHIL NFR BLD AUTO: 1.4 %
ERYTHROCYTE [DISTWIDTH] IN BLOOD BY AUTOMATED COUNT: 14.3 % (ref 10–15)
GLUCOSE UR STRIP-MCNC: NEGATIVE MG/DL
HCT VFR BLD AUTO: 44.3 % (ref 40–53)
HGB BLD-MCNC: 14.3 G/DL (ref 13.3–17.7)
HGB UR QL STRIP: NEGATIVE
KETONES UR STRIP-MCNC: NEGATIVE MG/DL
LEUKOCYTE ESTERASE UR QL STRIP: NEGATIVE
LYMPHOCYTES # BLD AUTO: 2.3 10E9/L (ref 0.8–5.3)
LYMPHOCYTES NFR BLD AUTO: 25.4 %
MCH RBC QN AUTO: 31.4 PG (ref 26.5–33)
MCHC RBC AUTO-ENTMCNC: 32.3 G/DL (ref 31.5–36.5)
MCV RBC AUTO: 97 FL (ref 78–100)
MONOCYTES # BLD AUTO: 0.6 10E9/L (ref 0–1.3)
MONOCYTES NFR BLD AUTO: 6.9 %
NEUTROPHILS # BLD AUTO: 6 10E9/L (ref 1.6–8.3)
NEUTROPHILS NFR BLD AUTO: 65.9 %
NITRATE UR QL: NEGATIVE
PH UR STRIP: 7.5 PH (ref 5–7)
PLATELET # BLD AUTO: 241 10E9/L (ref 150–450)
RBC # BLD AUTO: 4.55 10E12/L (ref 4.4–5.9)
SOURCE: ABNORMAL
SP GR UR STRIP: 1.02 (ref 1–1.03)
UROBILINOGEN UR STRIP-ACNC: 0.2 EU/DL (ref 0.2–1)
WBC # BLD AUTO: 9.1 10E9/L (ref 4–11)

## 2019-01-22 PROCEDURE — 36415 COLL VENOUS BLD VENIPUNCTURE: CPT | Performed by: FAMILY MEDICINE

## 2019-01-22 PROCEDURE — 81003 URINALYSIS AUTO W/O SCOPE: CPT | Performed by: FAMILY MEDICINE

## 2019-01-22 PROCEDURE — 80053 COMPREHEN METABOLIC PANEL: CPT | Performed by: FAMILY MEDICINE

## 2019-01-22 PROCEDURE — 84443 ASSAY THYROID STIM HORMONE: CPT | Performed by: FAMILY MEDICINE

## 2019-01-22 PROCEDURE — 85025 COMPLETE CBC W/AUTO DIFF WBC: CPT | Performed by: FAMILY MEDICINE

## 2019-01-22 PROCEDURE — 99214 OFFICE O/P EST MOD 30 MIN: CPT | Performed by: FAMILY MEDICINE

## 2019-01-22 RX ORDER — TOPIRAMATE 100 MG/1
100 TABLET, FILM COATED ORAL DAILY
Qty: 30 TABLET | Refills: 2 | Status: SHIPPED | OUTPATIENT
Start: 2019-01-22 | End: 2019-03-25

## 2019-01-22 RX ORDER — BUPROPION HYDROCHLORIDE 300 MG/1
300 TABLET ORAL EVERY MORNING
Qty: 30 TABLET | Refills: 2 | Status: SHIPPED | OUTPATIENT
Start: 2019-01-22 | End: 2019-03-25

## 2019-01-22 ASSESSMENT — ANXIETY QUESTIONNAIRES
GAD7 TOTAL SCORE: 16
4. TROUBLE RELAXING: MORE THAN HALF THE DAYS
7. FEELING AFRAID AS IF SOMETHING AWFUL MIGHT HAPPEN: MORE THAN HALF THE DAYS
6. BECOMING EASILY ANNOYED OR IRRITABLE: NEARLY EVERY DAY
2. NOT BEING ABLE TO STOP OR CONTROL WORRYING: MORE THAN HALF THE DAYS
3. WORRYING TOO MUCH ABOUT DIFFERENT THINGS: MORE THAN HALF THE DAYS
1. FEELING NERVOUS, ANXIOUS, OR ON EDGE: MORE THAN HALF THE DAYS
5. BEING SO RESTLESS THAT IT IS HARD TO SIT STILL: NEARLY EVERY DAY
GAD7 TOTAL SCORE: 16
7. FEELING AFRAID AS IF SOMETHING AWFUL MIGHT HAPPEN: MORE THAN HALF THE DAYS
GAD7 TOTAL SCORE: 16

## 2019-01-22 ASSESSMENT — PATIENT HEALTH QUESTIONNAIRE - PHQ9
10. IF YOU CHECKED OFF ANY PROBLEMS, HOW DIFFICULT HAVE THESE PROBLEMS MADE IT FOR YOU TO DO YOUR WORK, TAKE CARE OF THINGS AT HOME, OR GET ALONG WITH OTHER PEOPLE: VERY DIFFICULT
SUM OF ALL RESPONSES TO PHQ QUESTIONS 1-9: 16
SUM OF ALL RESPONSES TO PHQ QUESTIONS 1-9: 16

## 2019-01-22 ASSESSMENT — ACTIVITIES OF DAILY LIVING (ADL): DEPENDENT_IADLS:: TRANSPORTATION;INDEPENDENT

## 2019-01-22 ASSESSMENT — MIFFLIN-ST. JEOR: SCORE: 1605.1

## 2019-01-22 NOTE — PROGRESS NOTES
"Answers for HPI/ROS submitted by the patient on 1/22/2019   If you checked off any problems, how difficult have these problems made it for you to do your work, take care of things at home, or get along with other people?: Very difficult  PHQ9 TOTAL SCORE: 16  JOHNNIE 7 TOTAL SCORE: 16    SUBJECTIVE:   Bhavin Awad is a 58 year old male who presents to clinic today for the following health issues:    Medication Followup of     Taking Medication as prescribed: yes    Side Effects:  None    Medication Helping Symptoms:  yes         Generalized anxiety disorder- Patient reports that the last 2 months have been stressful.  He reports of feeling emotional and mentally unwell. Would like to consistently see someone to talk too. Wonders about seeing a psychiatrist.      Moderate episode of recurrent major depressive disorder (H) - Patient reports that his medications have improved his symptoms. Has been \"self-medicating\" with marijuana to increase appetite. Reports of weight loss due to life events, homelessness, and mental well being.   Wt Readings from Last 3 Encounters:   01/22/19 78.5 kg (173 lb)   11/19/18 79.8 kg (176 lb)   10/24/18 81.2 kg (179 lb)                   Problem list and histories reviewed & adjusted, as indicated.  Additional history: as documented    PAST MEDICAL HISTORY:   Past Medical History:   Diagnosis Date     Cervicalgia 7/24/2018     DEPRESS PSYCHOSIS-SEVERE(aka DEPRESSION) 6/16/2004    admitted [facility] in june 2004 , dr gonzales psychiatrist, significant suicidal ideation, and attempts       Essential hypertension 10/2/2018     Homeless 7/24/2018     LBP (low back pain)      Learning disability 7/24/2018     Left knee pain, unspecified chronicity 7/24/2018     Left shoulder pain, unspecified chronicity 7/24/2018     Non-cardiac chest pain 7/24/2018     Pulmonary nodules 7/24/2018     Right knee pain, unspecified chronicity 7/24/2018     Rotator cuff disorder        PAST " "SURGICAL HISTORY:   Past Surgical History:   Procedure Laterality Date     C EXPLORE SHOULDER JOINT      Arthroplasty, Shoulder, simonet, rotator cuff     HERNIORRHAPHY INGUINAL BILATERAL Bilateral 2015    Procedure: HERNIORRHAPHY INGUINAL BILATERAL;  Surgeon: Luís Conti MD;  Location: [facility] OR       FAMILY HISTORY:   Family History   Problem Relation Age of Onset     C.A.D. Father          age 66 MI      Cerebrovascular Disease Father      C.A.D. Paternal Grandfather          in his 50 heart disease     Diabetes Mother        SOCIAL HISTORY:   Social History     Tobacco Use     Smoking status: Current Every Day Smoker     Packs/day: 1.00     Smokeless tobacco: Never Used   Substance Use Topics     Alcohol use: No     Comment: sobriety one year     He remains in a homeless shelter.     Reviewed and updated as needed this visit by clinical staff  Tobacco  Allergies  Meds  Med Hx  Surg Hx  Fam Hx  Soc Hx      Reviewed and updated as needed this visit by Provider         ROS:  He feels abandoned by social work. Records indicate he declined telephone contact due tocell phone minutes. Not suicidal no systemic symptoms      This document serves as a record of the services and decisions personally performed and made by Andres Leal MD. It was created on his behalf by Chelsea Jimenez, a trained medical scribe. The creation of this document is based on the provider's statements to the medical scribe.  Chelsea Jimenez 2019 5:21 PM      OBJECTIVE:     /78 (BP Location: Right arm, Patient Position: Chair, Cuff Size: Adult Large)   Pulse 69   Temp 98  F (36.7  C) (Oral)   Resp 14   Ht 1.753 m (5' 9\")   Wt 78.5 kg (173 lb)   SpO2 99%   BMI 25.55 kg/m    Body mass index is 25.55 kg/m .    PSYCH: initially pressured. More joyous than I have ever seen him after Social Work visit    Diagnostic Test Results:  No results found for this or any previous visit (from the past 24 " hour(s)).    ASSESSMENT/PLAN:   (F41.1) Generalized anxiety disorder  Comment: treat refer. He sees counseling  Plan: buPROPion (WELLBUTRIN XL) 300 MG 24 hr tablet,         MENTAL HEALTH REFERRAL  - Adult; Psychiatry and        Medication Management; [place]iatry; [facility]         [facility] [phone].  Medication management & future         refills will be returned to [facility] PCP upon         completion of evaluation; We tung...          (F33.1) Moderate episode of recurrent major depressive disorder (H)  Comment: treat refer. He sees counseling  Plan: buPROPion (WELLBUTRIN XL) 300 MG 24 hr tablet,         Comprehensive metabolic panel, TSH with free T4        reflex, CBC with platelets and differential,         *UA reflex to Microscopic and Culture ([facility]         [facility] (except [place] and         [place]), MENTAL HEALTH REFERRAL  - Adult;         Psychiatry and Medication Management;         Psychiatry; [facility]: [facility]         Service [phone].  Medication management         & future refills will be returned to [facility] PCP         upon completion of evaluation; Serafin tung...          (M54.2) Cervicalgia  Comment: responsive refill  Plan: nabumetone (RELAFEN) 750 MG tablet          (M25.562) Left knee pain, unspecified chronicity  Comment: responsive refill  Plan: nabumetone (RELAFEN) 750 MG tablet            (M25.561) Right knee pain, unspecified chronicity  Comment: responsive refilll  Plan: nabumetone (RELAFEN) 750 MG tablet          (M25.551) Hip pain, right  Comment: responsive refill. Treated as sciatica  Plan: topiramate (TOPAMAX) 100 MG tablet          (M54.5) Midline low back pain with sciatica, unspecified chronicity  Comment: fill  Plan: topiramate (TOPAMAX) 100 MG tablet            The information in this document, created by the medical scribe for me, accurately reflects the services I personally performed and the decisions made by me. I  have reviewed and approved this document for accuracy prior to leaving the patient care area.  January 22, 2019 2:35 PM      Andres Leal MD  [facility]

## 2019-01-22 NOTE — LETTER
January 25, 2019      Bhavin Awad  [address] [place] [address]        Dear ,    We are writing to inform you of your test results.    Tests look pretty good.     Resulted Orders   Comprehensive metabolic panel   Result Value Ref [facility]    Sodium 143 133 - 144 mmol/L    Potassium 4.7 3.4 - 5.3 mmol/L    Chloride 115 (H) 94 - 109 mmol/L    Carbon Dioxide 20 20 - 32 mmol/L    Anion Gap 8 3 - 14 mmol/L    Glucose 77 70 - 99 mg/dL      Comment:      Non Fasting    Urea Nitrogen 9 7 - 30 mg/dL    Creatinine 0.82 0.66 - 1.25 mg/dL    GFR Estimate >90 >60 mL/min/[1.73_m2]      Comment:      Non  GFR Calc  Starting 12/18/2018, serum creatinine based estimated GFR (eGFR) will be   calculated using the Chronic Kidney Disease Epidemiology Collaboration   (CKD-EPI) equation.      GFR Estimate If Black >90 >60 mL/min/[1.73_m2]      Comment:       GFR Calc  Starting 12/18/2018, serum creatinine based estimated GFR (eGFR) will be   calculated using the Chronic Kidney Disease Epidemiology Collaboration   (CKD-EPI) equation.      Calcium 8.7 8.5 - 10.1 mg/dL    Bilirubin Total 0.3 0.2 - 1.3 mg/dL    Albumin 3.6 3.4 - 5.0 g/dL    Protein Total 6.3 (L) 6.8 - 8.8 g/dL    Alkaline Phosphatase 37 (L) 40 - 150 U/L    ALT 29 0 - 70 U/L    AST 22 0 - 45 U/L   TSH with free T4 reflex   Result Value Ref [facility]    TSH 1.74 0.40 - 4.00 mU/L   CBC with platelets and differential   Result Value Ref [facility]    WBC 9.1 4.0 - 11.0 10e9/L    RBC Count 4.55 4.4 - 5.9 10e12/L    Hemoglobin 14.3 13.3 - 17.7 g/dL    Hematocrit 44.3 40.0 - 53.0 %    MCV 97 78 - 100 fl    MCH 31.4 26.5 - 33.0 pg    MCHC 32.3 31.5 - 36.5 g/dL    RDW 14.3 10.0 - 15.0 %    Platelet Count 241 150 - 450 10e9/L    % Neutrophils 65.9 %    % Lymphocytes 25.4 %    % Monocytes 6.9 %    % Eosinophils 1.4 %    % Basophils 0.4 %    Absolute Neutrophil 6.0 1.6 - 8.3 10e9/L    Absolute Lymphocytes 2.3 0.8 - 5.3 10e9/L    Absolute  Monocytes 0.6 0.0 - 1.3 10e9/L    Absolute Eosinophils 0.1 0.0 - 0.7 10e9/L    Absolute Basophils 0.0 0.0 - 0.2 10e9/L    Diff Method Automated Method    *UA reflex to Microscopic and Culture ([facility] (except [place] and [place])   Result Value Ref [facility]    Color Urine Yellow     Appearance Urine Clear     Glucose Urine Negative NEG^Negative mg/dL    Bilirubin Urine Negative NEG^Negative    Ketones Urine Negative NEG^Negative mg/dL    Specific Gravity Urine 1.020 1.003 - 1.035    Blood Urine Negative NEG^Negative    pH Urine 7.5 (H) 5.0 - 7.0 pH    Protein Albumin Urine Negative NEG^Negative mg/dL    Urobilinogen Urine 0.2 0.2 - 1.0 EU/dL    Nitrite Urine Negative NEG^Negative    Leukocyte Esterase Urine Negative NEG^Negative    Source Midstream Urine        If you have any questions or concerns, please call the clinic at the number listed above.       Sincerely,        Anrdes Leal MD

## 2019-01-23 ASSESSMENT — ACTIVITIES OF DAILY LIVING (ADL): DEPENDENT_IADLS:: TRANSPORTATION;INDEPENDENT

## 2019-01-23 ASSESSMENT — ANXIETY QUESTIONNAIRES: GAD7 TOTAL SCORE: 16

## 2019-01-23 ASSESSMENT — PATIENT HEALTH QUESTIONNAIRE - PHQ9: SUM OF ALL RESPONSES TO PHQ QUESTIONS 1-9: 16

## 2019-01-23 NOTE — PROGRESS NOTES
Clinic Care Coordination Contact    Clinic Care Coordination Contact  OUTREACH    Referral Information:  Referral Source: Care Team(Dr. Leal )    Primary Diagnosis: Psychosocial    Chief Complaint   Patient presents with     Clinic Care Coordination - Face To Face        Universal Utilization: Met with Pt in clinic after PCP appointment and he provided  CC information about his current situation. He had been closely  working with the previous  CC in applying for SSDI and had begun the SMRT process and Pt uncertain what the next steps are. Pt reported that he submitted SSDA application in November 2018 and was told that it takes 6 months to a year to find out if it is approved. Pt asked [facility] to contact his [facility] Financial Worker, Lainey, who has more information about the SMRT application.  Pt stated he would call [facility] with [employer] last name and phone number.    Pt is currently living in [place] at the [facility] which he said is a program of the [employer].  He stated that he would like to live in [place] because that is where he is from and that is where his son lives.  .      Clinic Utilization  Difficulty keeping appointments:: No  Compliance Concerns: No  No-Show Concerns: No  No PCP office visit in Past Year: No  Utilization    Last refreshed: 1/22/2019  7:41 PM:  Hospital Admissions 0           Last refreshed: 1/22/2019  7:41 PM:  ED Visits 1           Last refreshed: 1/22/2019  7:41 PM:  No Show Count (past year) 1              Current as of: 1/22/2019  7:41 PM               Functional Status:  Dependent ADLs:: Ambulation-no assistive device  Dependent IADLs:: Transportation, Independent  Bed or wheelchair confined:: No  Mobility Status: Independent    Living Situation:  Current living arrangement:: Pt reported that he is residing at the [facility] in [facility] [place] and has been there for five months or so.      Type of residence::  Homeless    Diet/Exercise/Sleep:  Diet:: Regular  Inadequate nutrition (GOAL):: No  Food Insecurity: No  Tube Feeding: No  Exercise:: Yes  Inadequate activity/exercise (GOAL):: No  Significant changes in sleep pattern (GOAL): No    Transportation:  Transportation concerns (GOAL):: Yes  Transportation means:: Medical transport     Psychosocial:  Mental health DX:: Yes(patient does not know his diagnosis - has been 10 years since he last saw mental health )  Mental health DX how managed:: None  Mental health management concern (GOAL):: Yes  Informal Support system:: Children     Financial/Insurance:   Financial/Insurance concerns (GOAL):: Yes  Continues to work on applying for SSDI and is in the process of applying for SMRT.  His [facility] FW is is Lainey Nunez and her number is [phone]      Resources and Interventions:  Current Resources:    ;   Community Resources: [place] Programs([employer] benefits and [place] )  Supplies used at home:: None  Equipment Currently Used at Home: none    Advance Care Plan/Directive  Advanced Care Plans/Directives on file:: No    Referrals Placed: Behavioral Health Providers, Disability Linkage line(CCRN asked provider to order BHP )     Goals:   Goals        General    1: Financial Wellbeing (pt-stated)     Notes - Note created  7/26/2018 10:52 AM by Iman Dumont LSW    Goal Statement: I will obtain additional financial resources within 1-3 months to support my overall health and to obtain alternative housing.    Measure of Success: I will verbally report my updates to the care coordination team and PCP.   Supportive Steps to Achieve: I will outreach to my financial worker this week and inquire about cash assistance and have this worker send me the necessary forms to obtain additional support.  I will inquire about housing assistance as well when I talk with my financial worker.  I have agreed to outreach to the care coordination team after I talk with my financial worker  if questions arise.   Barriers: I am currently residing with my son and may need to leave this residence in the future.    Strengths: I appear motivated to achieve additional financial supports and resources.    Date to Achieve By: 9/1/18        2: Transportation (pt-stated)     Notes - Note created  7/26/2018 10:56 AM by Iman Dumont LSW    Goal Statement: I will obtain rides through my insurance in 1-2 weeks for all upcoming medical appointments to ensure I am able to follow up with providers as recommended.    Measure of Success: I will verbally update care team after I talk with insurance or if questions arise.    Supportive Steps to Achieve: [facility] provided me with the [employer] [place] number to call today to line up medical rides. I was educated on this service today and reminded of the timeline to set up rides.  I can outreach to care coordination team if I have questions or concerns regarding transportation.   Barriers: Family rides are becoming difficult to line up, therefore I am looking for additional resources.    Strengths: I appear motivated to attend all of my medical and MH appointments.    Date to Achieve By: 8/15/18        Psychosocial (pt-stated)     Notes - Note edited  11/30/2018  1:47 PM by Leonora Dougherty LGSW    Goal Statement: I will be living somewhere better.  Measure of Success: Get on housing lists with [facility]. Move into a [place].  Supportive Steps to Achieve: Work with Care Coordination, maintain appointments with providers, practice coping skills, maintain medication compliance.  Barriers: Homelessness, financial insecurity.  Strengths: Motivated and determined, engaged in Care Coordination.  Date to Achieve By: 1/8/18  Patient expressed understanding of goal: Pt reports understanding and denies any additional questions or concerns at this times.  CC engaged in AIDET communication during encounter.        Psychosocial (pt-stated)     Notes - Note edited  11/30/2018  1:46  PM by Leonora Dougherty, [facility]    Goal Statement: I will have better income and resources..  Measure of Success: Be approved for SMRT and GRH and GRH-2.  Supportive Steps to Achieve: Work with providers and care coordinator to facilitate approvals.  Barriers: Homelessness, financial insecurity.  Strengths: Motivated and determined.  Date to Achieve By: 1/8/18  Patient expressed understanding of goal: Pt reports understanding and denies any additional questions or concerns at this times.  CC engaged in AIDET communication during encounter.                Patient/Caregiver understanding: SWCC utlized Aidet communication technique and Pt expressed understanding of all that was discussed in the meeting.    Outreach Frequency: weekly  Future Appointments              In 1 week Sharon Adame LP [facility]    In 2 months Andres Leal MD [facility]          Plan: Pt will call [facility] to provide the number and full name for his [facility] Financial Worker.  [facility] willl call the FW and ask for status on the SMRT application and learn if further steps are needed.   CC will follow back up with Pt in 1-2 business days.    VANDANA Pelaez   Care Coordination Team  [phone]

## 2019-01-24 LAB
ALBUMIN SERPL-MCNC: 3.6 G/DL (ref 3.4–5)
ALP SERPL-CCNC: 37 U/L (ref 40–150)
ALT SERPL W P-5'-P-CCNC: 29 U/L (ref 0–70)
ANION GAP SERPL CALCULATED.3IONS-SCNC: 8 MMOL/L (ref 3–14)
AST SERPL W P-5'-P-CCNC: 22 U/L (ref 0–45)
BILIRUB SERPL-MCNC: 0.3 MG/DL (ref 0.2–1.3)
BUN SERPL-MCNC: 9 MG/DL (ref 7–30)
CALCIUM SERPL-MCNC: 8.7 MG/DL (ref 8.5–10.1)
CHLORIDE SERPL-SCNC: 115 MMOL/L (ref 94–109)
CO2 SERPL-SCNC: 20 MMOL/L (ref 20–32)
CREAT SERPL-MCNC: 0.82 MG/DL (ref 0.66–1.25)
GFR SERPL CREATININE-BSD FRML MDRD: >90 ML/MIN/{1.73_M2}
GLUCOSE SERPL-MCNC: 77 MG/DL (ref 70–99)
POTASSIUM SERPL-SCNC: 4.7 MMOL/L (ref 3.4–5.3)
PROT SERPL-MCNC: 6.3 G/DL (ref 6.8–8.8)
SODIUM SERPL-SCNC: 143 MMOL/L (ref 133–144)
TSH SERPL DL<=0.005 MIU/L-ACNC: 1.74 MU/L (ref 0.4–4)

## 2019-01-31 ENCOUNTER — PATIENT OUTREACH (OUTPATIENT)
Dept: CARE COORDINATION | Facility: CLINIC | Age: 59
End: 2019-01-31

## 2019-01-31 ASSESSMENT — ACTIVITIES OF DAILY LIVING (ADL): DEPENDENT_IADLS:: TRANSPORTATION;INDEPENDENT

## 2019-01-31 NOTE — PROGRESS NOTES
Clinic Care Coordination Contact    Clinic Care Coordination Contact  OUTREACH    Referral Information:  Referral Source: Care Team(Dr. Leal )    Primary Diagnosis: Psychosocial    Chief Complaint   Patient presents with     Clinic Care Coordination - Follow-up        Matador Utilization:   Clinic Utilization  Difficulty keeping appointments:: No  Compliance Concerns: No  No-Show Concerns: No  No PCP office visit in Past Year: No  Utilization    Last refreshed: 1/25/2019  1:34 PM:  Hospital Admissions 0           Last refreshed: 1/25/2019  1:34 PM:  ED Visits 1           Last refreshed: 1/25/2019  1:34 PM:  No Show Count (past year) 1              Current as of: 1/25/2019  1:34 PM              *Living Situation:  Current living arrangement:: Other(currently living with son and daughter in law and grandchildren - they would like him out of the house by end of summer )  Type of residence:: Homeless    Diet/Exercise/Sleep:  Diet:: Regular  Inadequate nutrition (GOAL):: No  Food Insecurity: No  Tube Feeding: No  Exercise:: Yes    Transportation:  Transportation concerns (GOAL):: Yes  Transportation means:: Medical transport     Psychosocial:  Mental health DX:: Yes(patient does not know his diagnosis - has been 10 years since he last saw mental health )  Mental health DX how managed:: None  Mental health management concern (GOAL):: Yes  Informal Support system:: Children     Financial/Insurance:   Financial/Insurance concerns (GOAL):: Yes  RODOLFO updated Pt that she spoke to FW two days ago and she recommended not referring him to SMRT.  RODOLFO spoke with representative at [facility] ant to representative at SMRT hotline and was told that no reason why he can't have application in for sliding scale insulin and also pursue SMRT because it is faster and has better coverage.  RODOLFO called Fiancial Worker and lm that he wishes to proceed with the SMRT referral and to let MANOLOCC what information she still needs, and  provide her with his current mailing address.         Resources and Interventions:  Current Resources:    ;   Community Resources: [place] Programs([employer] benefits and [place] )  Supplies used at home:: None  Equipment Currently Used at Home: none    Advance Care Plan/Directive  Advanced Care Plans/Directives on file:: No    Referrals Placed: Behavioral Health Providers, Disability Linkage line(CCRN asked provider to order BHP )     Goals:   Goals        General    1: Financial Wellbeing (pt-stated)     Notes - Note created  7/26/2018 10:52 AM by Iman Dumont LSW    Goal Statement: I will obtain additional financial resources within 1-3 months to support my overall health and to obtain alternative housing.    Measure of Success: I will verbally report my updates to the care coordination team and PCP.   Supportive Steps to Achieve: I will outreach to my financial worker this week and inquire about cash assistance and have this worker send me the necessary forms to obtain additional support.  I will inquire about housing assistance as well when I talk with my financial worker.  I have agreed to outreach to the care coordination team after I talk with my financial worker if questions arise.   Barriers: I am currently residing with my son and may need to leave this residence in the future.    Strengths: I appear motivated to achieve additional financial supports and resources.    Date to Achieve By: 9/1/18        2: Transportation (pt-stated)     Notes - Note created  7/26/2018 10:56 AM by Iman Dumont LSW    Goal Statement: I will obtain rides through my insurance in 1-2 weeks for all upcoming medical appointments to ensure I am able to follow up with providers as recommended.    Measure of Success: I will verbally update care team after I talk with insurance or if questions arise.    Supportive Steps to Achieve: [facility] provided me with the [employer] number to call today to line up medical rides. I was educated on  this service today and reminded of the timeline to set up rides.  I can outreach to care coordination team if I have questions or concerns regarding transportation.   Barriers: Family rides are becoming difficult to line up, therefore I am looking for additional resources.    Strengths: I appear motivated to attend all of my medical and MH appointments.    Date to Achieve By: 8/15/18        Psychosocial (pt-stated)     Notes - Note edited  11/30/2018  1:47 PM by Leonora Dougherty LGSW    Goal Statement: I will be living somewhere better.  Measure of Success: Get on housing lists with [facility]. Move into a [place].  Supportive Steps to Achieve: Work with Care Coordination, maintain appointments with providers, practice coping skills, maintain medication compliance.  Barriers: Homelessness, financial insecurity.  Strengths: Motivated and determined, engaged in Care Coordination.  Date to Achieve By: 1/8/18  Patient expressed understanding of goal: Pt reports understanding and denies any additional questions or concerns at this times. SW CC engaged in AIDET communication during encounter.        Psychosocial (pt-stated)     Notes - Note edited  11/30/2018  1:46 PM by Leonora Dougherty LGSW    Goal Statement: I will have better income and resources..  Measure of Success: Be approved for SMRT and GR[facility] and [facility].  Supportive Steps to Achieve: Work with providers and care coordinator to facilitate approvals.  Barriers: Homelessness, financial insecurity.  Strengths: Motivated and determined.  Date to Achieve By: 1/8/18  Patient expressed understanding of goal: Pt reports understanding and denies any additional questions or concerns at this times. SW CC engaged in AIDET communication during encounter.                Patient/Caregiver understanding: Pt expressed understanding of what was discussed during telephone outreach.     Outreach Frequency: weekly  Future Appointments              Tomorrow Sharon Adame LP  [facility], [facility]    In 1 week Perry Fernandez CNP [facility]    In 1 month Andres Leal MD [facility]          Plan: [facility] will follow up with Financial Worker requesting that she proceed with SMRT referral.  [facility] will meet with Pt at [facility] on 2/1/19 after his therapy appointment.    AVNDANA Pelaez   Care Coordination Team  [phone]

## 2019-02-01 ENCOUNTER — OFFICE VISIT (OUTPATIENT)
Dept: PSYCHOLOGY | Facility: CLINIC | Age: 59
End: 2019-02-01
Payer: COMMERCIAL

## 2019-02-01 DIAGNOSIS — F41.1 GENERALIZED ANXIETY DISORDER: ICD-10-CM

## 2019-02-01 DIAGNOSIS — F33.1 MODERATE EPISODE OF RECURRENT MAJOR DEPRESSIVE DISORDER (H): Primary | ICD-10-CM

## 2019-02-01 PROCEDURE — 90834 PSYTX W PT 45 MINUTES: CPT | Performed by: PSYCHOLOGIST

## 2019-02-01 NOTE — PROGRESS NOTES
"                                             Progress Note    Client Name: Bhavin Awad  Date: 2-01-18         Service Type: Individual      Session Start Time: 11:08  Session End Time: 11:55      Session Length: 38- 52  min     Session #: 7     Attendees: Client    Treatment Plan Last Reviewed: today  PHQ-9 / JOHNNIE-7 : see flow sheets     DATA      Progress Since Last Session (Related to Symptoms / Goals / Homework):   Symptoms: no change overall . endorses ruminating thoughts.  stable but with anxiety, stress, sleep, appetite, energy  disruption, feeling bad about self, mood disruption    Homework: Completed in session      Episode of Care Goals: Satisfactory progress - PREPARATION (Decided to change - considering how); Intervened by negotiating a change plan and determining options / strategies for behavior change, identifying triggers, exploring social supports, and working towards setting a date to begin behavior change.    Is working with Lainey with  [facility] Care Coordination.        Current / Ongoing Stressors and Concerns:   Housing and mood disruption      Treatment Objective(s) Addressed in This Session:   Support     Intervention:     Reviewed symptom, stressors, skills used since last visit.    Client reports updates:  Continues on medication.    Accomplishments:  Did get his social security card duplicate.   Has a state ID.  On hold on his birth certificate- shares that he was born on a   Has started with Lainey:  once in person and once on the phone.  Has an appointment with a psychiatrist on the 7th.  De-railed on the birth certificate due to  government shut down.      Reports  \"I still get attacks\" (anxiety) when I'm just lying there.   Discussed testing. Client is open to it.     Explored losses.   \"Everything I invest in  I lose\". Identified 3 core losses.  Processed emotions    Discussed barriers:  Never graduated and no GED. Was held back in 1st grade.  Explored  needing to re-read. Had to block " "part of the page to read.  Reports that he did not have much help at home and that he    was put in the easiest classes. Couldn't focus or sit still.  Believe that his family doctors told his parents that he had some form of  ADHD.       Has some insight about how he was in his marriages:  sharing \" immaturity and selfishness\".  He shares that at times he ignored the law and states that as laws got more stick \"thats when my problems started\".    Processed emotions.        ASSESSMENT: Current Emotional / Mental Status (status of significant symptoms):   Risk status (Self / Other harm or suicidal ideation)   Client denies current fears or concerns for personal safety.   Client denies current or recent suicidal ideation or behaviors.   Client denies current or recent homicidal ideation or behaviors.   Client denies current or recent self injurious behavior or ideation.   Client denies other safety concerns.   Client Client reports there has been a change in risk factors since their last session.  using a homeless shelter   Client Client reports there has been no change in protective factors since their last session.     A safety and risk management plan has not been developed at this time, however client was given the after-hours number / 911 should there be a change in any of these risk factors.     Appearance:   Appropriate    Eye Contact:   Good    Psychomotor Behavior: Normal    Attitude:   Cooperative    Orientation:   All   Speech   Rate / Production: Normal    volume:  Normal    Mood:    Anxious  Depressed  Normal   Affect:    Appropriate    Thought Content:  Clear    Thought Form:  Coherent  Logical    Insight:    Good      Medication Review:   No current psychiatric medications prescribed     Medication Compliance:   NA     Changes in Health Issues:   None reported     Chemical Use Review:   Substance Use: Chemical use reviewed, no active concerns identified      Tobacco Use: No current tobacco use.  " "     Collateral Reports Completed:   Routed note to PCP as needed.    PLAN: (Client Tasks / Therapist Tasks / Other)  Client indicates that the is not wishing to continue with this provider.   Wants to see a psychiatrist and is scheduled this month.     Past hw   1) daily problem solving steps - food /sheltr  2) manage pain, 3) get his own place 4) money for the 's license.  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    Anticipated number of session or this episode of care: 12      MeasurableTreatment Goal(s) related to diagnosis / functional impairment(s)  Goal 1: Client will attend to  problem solving daily. Connect with social workers timely.    I will know I've met my goal when .  stable housing / complete papers enroll in programs to help himself.    Objective #A (Client Action)    Client will compile a list of boundaries that they would like to set with others. Increase diplomacy.  Status: New - Date: 10-02-18   Update: 2-01-19  Client has made some steps towards independence.      Intervention(s)  Therapist will assit with problem solving.    MeasurableTreatment Goal(s) related to diagnosis / functional impairment(s)  Goal 2:  Client will begin to understand barriers and participate in action steps for a more stable future.  Objective #A (Client Action)    Client will identify triggers.ex \"I tell it like it is\"  .  Ex  All or none thinking which " contributes to anxiety .    Status: New - Date: 10-08-18   Update: 2-01-19  Client has made some steps towards independence.      Intervention(s)  Therapist will assit with teaching skills to manage triggers, move to problem solving.      Client has reviewed and agreed to the above plan.      Sharon Adame LP  October 2, 2018

## 2019-02-07 ENCOUNTER — PATIENT OUTREACH (OUTPATIENT)
Dept: CARE COORDINATION | Facility: CLINIC | Age: 59
End: 2019-02-07

## 2019-02-07 ENCOUNTER — OFFICE VISIT (OUTPATIENT)
Dept: PSYCHIATRY | Facility: CLINIC | Age: 59
End: 2019-02-07
Payer: COMMERCIAL

## 2019-02-07 VITALS
HEART RATE: 92 BPM | DIASTOLIC BLOOD PRESSURE: 79 MMHG | WEIGHT: 179 LBS | BODY MASS INDEX: 26.51 KG/M2 | SYSTOLIC BLOOD PRESSURE: 116 MMHG | RESPIRATION RATE: 16 BRPM | OXYGEN SATURATION: 96 % | TEMPERATURE: 98.6 F | HEIGHT: 69 IN

## 2019-02-07 DIAGNOSIS — F41.1 GENERALIZED ANXIETY DISORDER: ICD-10-CM

## 2019-02-07 DIAGNOSIS — F33.1 MODERATE EPISODE OF RECURRENT MAJOR DEPRESSIVE DISORDER (H): Primary | ICD-10-CM

## 2019-02-07 PROCEDURE — 90792 PSYCH DIAG EVAL W/MED SRVCS: CPT | Performed by: NURSE PRACTITIONER

## 2019-02-07 RX ORDER — ESCITALOPRAM OXALATE 10 MG/1
10 TABLET ORAL DAILY
Qty: 30 TABLET | Refills: 1 | Status: SHIPPED | OUTPATIENT
Start: 2019-02-07 | End: 2019-03-25

## 2019-02-07 ASSESSMENT — ANXIETY QUESTIONNAIRES
6. BECOMING EASILY ANNOYED OR IRRITABLE: MORE THAN HALF THE DAYS
2. NOT BEING ABLE TO STOP OR CONTROL WORRYING: MORE THAN HALF THE DAYS
7. FEELING AFRAID AS IF SOMETHING AWFUL MIGHT HAPPEN: MORE THAN HALF THE DAYS
3. WORRYING TOO MUCH ABOUT DIFFERENT THINGS: MORE THAN HALF THE DAYS
GAD7 TOTAL SCORE: 15
4. TROUBLE RELAXING: MORE THAN HALF THE DAYS
7. FEELING AFRAID AS IF SOMETHING AWFUL MIGHT HAPPEN: MORE THAN HALF THE DAYS
GAD7 TOTAL SCORE: 15
1. FEELING NERVOUS, ANXIOUS, OR ON EDGE: MORE THAN HALF THE DAYS
5. BEING SO RESTLESS THAT IT IS HARD TO SIT STILL: NEARLY EVERY DAY
GAD7 TOTAL SCORE: 15

## 2019-02-07 ASSESSMENT — PATIENT HEALTH QUESTIONNAIRE - PHQ9
10. IF YOU CHECKED OFF ANY PROBLEMS, HOW DIFFICULT HAVE THESE PROBLEMS MADE IT FOR YOU TO DO YOUR WORK, TAKE CARE OF THINGS AT HOME, OR GET ALONG WITH OTHER PEOPLE: VERY DIFFICULT
SUM OF ALL RESPONSES TO PHQ QUESTIONS 1-9: 17
SUM OF ALL RESPONSES TO PHQ QUESTIONS 1-9: 17

## 2019-02-07 ASSESSMENT — ACTIVITIES OF DAILY LIVING (ADL): DEPENDENT_IADLS:: TRANSPORTATION;INDEPENDENT

## 2019-02-07 ASSESSMENT — MIFFLIN-ST. JEOR: SCORE: 1632.32

## 2019-02-07 NOTE — PROGRESS NOTES
"                                                         Outpatient Psychiatric Evaluation- Standard  Adult    Name:  Bhavin Awad  : 1960    Source of Referral:  Primary Care Provider: Andres Leal MD   Last visit: 2018  Current Psychotherapist: Denisse Adame   Last visit: 2019    Identifying Data:  Patient is a 58 year old,   White American male  who presents for initial visit with me.  Patient is currently unemployed and homeless.. Patient attended the session alone.  Discussed limits of confidentiality today. My Practice Policy was reviewed and signed.     Patient prefers to be called: \"Bhavin\"  Do you know why your primary doctor referred you to see Perry Fernandez? \"See Denisse but its not enough.Dont know yet if the Wellbutrin works. Panic attacks still, but not as much.       Chief Complaint:    Patient reports: \"I'm confused! I don't know what's going on with me!\"      HPI:    Patient reports a history of a learning disability as a child. Was in special education and states he did not end up graduating high school.    First time he sought  health treatment as an adult was in . Saw a psychiatrist at the time for depression and sleep. Can't recall psychiatrist's name or where located. Was a private practice and presumes psychiatrist retired at this point. Might have been prescribed Celexa; can't recall. Saw this psychiatrist for a year. States he had some sort of detailed evaluation/test and psychiatrist was getting around to reviewing this with him when he had to abruptly end treatment as his insurance lapsed following his divorce in .    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 States his \"body is breaking down\" from wear of physical work, and is now on SSDI for physical disability. Was living with his son in [place], but was kicked out of home around 2018 as was not getting along with his future daughter-in-law. Has been homeless since " "and is currently residing at [facility] in [facility]. Has a  there working on getting him housing, though frustratingly slow. He     States he's under heightened stress in shelter and has no social supports. Is \"not close\" with his four children; does not communicate with ex-wife; has no friends. States \"I need someone to talk to!\" Has been seeing Dr. Adame at [facility] since 08/2018 for psychotherapy, but do to limited schedule, sees every 2 to 8 weeks. Lainey ROSS is care coordinator at [facility].    States he has \"no confidence, no self esteem.\" Mood \"back and forth\"; when down or overwhelmed \"don't bother me\". If worried he'll lose his temper he's \"just leave the situation\". Tries to go outside, but not feasible this time of year. He denies suicidal ideation. Has been \"self-medicating\" with cannabis \"most of my life\"; states it helps his mood, anxiety, sleep and appetite. No drawbacks to use he states, and not likely to change. He denies EtOH or any other substance use.    Patient states PCP prescribed him Wellbutrin in 10/2018; tapered to 300 mg daily; \"anxiety not as severe, but still there\"; denies side effects. Topamax additionally prescribed, up to 100 mg daily; prescribed for pain and helps somewhat; wonders if contributing to constipation.        Psychiatric Review of Symptoms:  Depression: Interest: Decrease  Depressed Mood  Sleep: Decrease   Energy: Decrease  Appetite: Decrease   Guilt: Increase  Concentration: Decrease  Irritability: Increase   Ruminations: Increase    PHQ-9 scores:   PHQ-9 SCORE 11/8/2018 12/6/2018 1/22/2019   PHQ-9 Total Score MyChart - - 16 (Moderately severe depression)   PHQ-9 Total Score 19 18 16     Latosha:  No symptoms   MDQ Score: Negative Screen  Anxiety: Feeling nervous, anxious, or on edge  Uncontrolled worrying  Worrying too much about different things  Trouble relaxing  Restlessness  Easily annoyed or irritable  Thoughts of impending " doom    JOHNNIE-7 scores:    JOHNNIE-7 SCORE 11/8/2018 12/6/2018 1/22/2019   Total Score - - 16 (severe anxiety)   Total Score 20 16 16     Answers for HPI/ROS submitted by the patient on 2/7/2019   If you checked off any problems, how difficult have these problems made it for you to do your work, take care of things at home, or get along with other people?: Very difficult  PHQ9 TOTAL SCORE: 17  JOHNNIE 7 TOTAL SCORE: 15        Psychiatric History:   No hospitalizations  No suicide attempts.      Substance Use History:  Long-standing cannabis dependence  No ETOH  No substance use.    Past Medical History:  Past Medical History:   Diagnosis Date     Cervicalgia 7/24/2018     DEPRESS PSYCHOSIS-SEVERE(aka DEPRESSION) 6/16/2004    admitted [facility] in june 2004 , dr gonzales psychiatrist, significant suicidal ideation, and attempts       Essential hypertension 10/2/2018     Homeless 7/24/2018     LBP (low back pain)      Learning disability 7/24/2018     Left knee pain, unspecified chronicity 7/24/2018     Left shoulder pain, unspecified chronicity 7/24/2018     Non-cardiac chest pain 7/24/2018     Pulmonary nodules 7/24/2018     Right knee pain, unspecified chronicity 7/24/2018     Rotator cuff disorder       Surgery:   Past Surgical History:   Procedure Laterality Date     C EXPLORE SHOULDER JOINT      Arthroplasty, Shoulder, simonet, rotator cuff     HERNIORRHAPHY INGUINAL BILATERAL Bilateral 7/28/2015    Procedure: HERNIORRHAPHY INGUINAL BILATERAL;  Surgeon: Luís Conti MD;  Location: [facility] OR     Allergies:     Allergies   Allergen Reactions     No Known Drug Allergies      Primary Care Provider: Andres Leal MD  Seizures or Head Injury: Multiple concussions in youth; denies seizures    Current Medications:    Current Outpatient Medications:      buPROPion (WELLBUTRIN XL) 300 MG 24 hr tablet, Take 1 tablet (300 mg) by mouth every morning, Disp: 30 tablet, Rfl: 2     IBUPROFEN PO, Take 200 mg by  "mouth every 4 hours as needed for moderate pain (Over 1200mg a day), Disp: , Rfl:      nabumetone (RELAFEN) 750 MG tablet, Take 1 tablet (750 mg) by mouth daily, Disp: 30 tablet, Rfl: 2     topiramate (TOPAMAX) 100 MG tablet, Take 1 tablet (100 mg) by mouth daily Use second, Disp: 30 tablet, Rfl: 2    The [place] Prescription Monitoring Program has been reviewed and there are no concerns about diversionary activity for controlled substances at this time.    Vital Signs:  Vitals: /79 (BP Location: Right arm, Patient Position: Chair, Cuff Size: Adult Regular)   Pulse 92   Temp 98.6  F (37  C) (Oral)   Resp 16   Ht 1.753 m (5' 9\")   Wt 81.2 kg (179 lb)   SpO2 96%   BMI 26.43 kg/m      Labs:  Most recent laboratory results reviewed and the pertinent results include:  Last Comprehensive Metabolic Panel:  Sodium   Date Value Ref Range Status   01/22/2019 143 133 - 144 mmol/L Final     Potassium   Date Value Ref Range Status   01/22/2019 4.7 3.4 - 5.3 mmol/L Final     Chloride   Date Value Ref Range Status   01/22/2019 115 (H) 94 - 109 mmol/L Final     Carbon Dioxide   Date Value Ref Range Status   01/22/2019 20 20 - 32 mmol/L Final     Anion Gap   Date Value Ref Range Status   01/22/2019 8 3 - 14 mmol/L Final     Glucose   Date Value Ref Range Status   01/22/2019 77 70 - 99 mg/dL Final     Comment:     Non Fasting     Urea Nitrogen   Date Value Ref Range Status   01/22/2019 9 7 - 30 mg/dL Final     Creatinine   Date Value Ref Range Status   01/22/2019 0.82 0.66 - 1.25 mg/dL Final     GFR Estimate   Date Value Ref Range Status   01/22/2019 >90 >60 mL/min/[1.73_m2] Final     Comment:     Non  GFR Calc  Starting 12/18/2018, serum creatinine based estimated GFR (eGFR) will be   calculated using the Chronic Kidney Disease Epidemiology Collaboration   (CKD-EPI) equation.       Calcium   Date Value Ref Range Status   01/22/2019 8.7 8.5 - 10.1 mg/dL Final     Bilirubin Total   Date Value Ref Range " "Status   2019 0.3 0.2 - 1.3 mg/dL Final     Alkaline Phosphatase   Date Value Ref Range Status   2019 37 (L) 40 - 150 U/L Final     ALT   Date Value Ref Range Status   2019 29 0 - 70 U/L Final     AST   Date Value Ref Range Status   2019 22 0 - 45 U/L Final     Lab Results   Component Value Date    TSH 1.74 2019           Review of Systems:  10 systems (general, cardiovascular, respiratory, eyes, ENT, endocrine, GI, , M/S, neurological) were reviewed. Most pertinent finding(s) is/are: back and knee pain. The remaining systems are all unremarkable.    Family History:   Patient reported family history includes:   Family History   Problem Relation Age of Onset     C.A.D. Father          age 66 MI      Cerebrovascular Disease Father      C.A.D. Paternal Grandfather          in his 50 heart disease     Diabetes Mother      Father: alcoholism  Brother: alcoholism    Social History:   States childhood \"horrible\"; family \"dysfunctional\" but denies explicit trauma  Not close with family  4 children (2 sons and 2 daughters)          Mental Status Examination:     Appearance:  awake, alert and adequately groomed  Attitude:  cooperative   Eye Contact:  good  Gait and Station: Normal  Psychomotor Behavior:  intact station, gait and muscle tone  Oriented to:  time, person, and place  Attention Span and Concentration:  Normal  Speech:  clear, coherent  Mood:  anxious and depressed  Affect:  appropriate and in normal range  Associations:  no loose associations  Thought Process:  logical, linear and goal oriented  Thought Content:  Appropriate to Interview  Recent and Remote Memory:  intact Not formally assessed. No amnesia.  Fund of Knowledge: appropriate  Insight:  good  Judgment:  intact  Impulse Control:  intact    Suicide Risk Assessment:  Today Bhavin Awad denies any suicidal ideation or self-harm impulses. Therefore, based on all available evidence including the factors cited " above, Bhavin Awad does not appear to be at imminent risk for self-harm, does not meet criteria for a 72-hr hold, and therefore remains appropriate for ongoing outpatient level of care.  A thorough assessment of risk factors related to suicide and self-harm have been reviewed and are noted above. The patient convincingly denies acute suicidality on several occasions. Local community safety resources reviewed and printed for patient to use if needed. There was no deceit detected, and the patient presented in a manner that was believable.     DSM5  Diagnosis:  296.32 (F33.1) Major Depressive Disorder, Recurrent Episode, Moderate _  300.02 (F41.1) Generalized Anxiety Disorder    Medical Comorbidities Include:   Patient Active Problem List    Diagnosis Date Noted     Essential hypertension 10/02/2018     Priority: Medium     Major depressive disorder 08/28/2018     Priority: Medium     Generalized anxiety disorder 08/28/2018     Priority: Medium     Non-cardiac chest pain 07/24/2018     Priority: Medium     Pulmonary nodules 07/24/2018     Priority: Medium     Right knee pain, unspecified chronicity 07/24/2018     Priority: Medium     Left knee pain, unspecified chronicity 07/24/2018     Priority: Medium     Left shoulder pain, unspecified chronicity 07/24/2018     Priority: Medium     Cervicalgia 07/24/2018     Priority: Medium     Learning disability 07/24/2018     Priority: Medium     illiterate       Homeless 07/24/2018     Priority: Medium     Hyperlipidemia with target LDL less than 160 08/11/2015     Priority: Medium     Tobacco use disorder 07/09/2015     Priority: Medium     Primary localized osteoarthrosis, other specified sites 06/07/2006     Priority: Medium     Raynaud's syndrome 04/18/2003     Priority: Medium         A 12-item WHODAS 2.0 assessment was completed by the patient today and recorded in EPIC.  No flowsheet data found.    The Patient Activation Measure (KAREN) score was completed and recorded  in [facility]. This assesses patient knowledge, skill, and confidence for self-management. No flowsheet data found.             Impression:  Bhavin Awad is a 58 year old male with a history of depression, anxiety and cannabis dependence. 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 I believe his obtainment of stable housing should be a priority towards his mental health, and hopefully in time he could also mend some of his family relationships. I am not certain of a learning disorder, though have no major reason to doubt this either. He is currently being treated with Wellbutrin and this is not adequate to his mood and anxiety symptoms. I recommend a trial of Lexapro. He is encouraged to continue participation with psychotherapy and engagement with care coordinator.    Medication side effects and alternatives reviewed. Health promotion activities recommended and reviewed today. All questions addressed. Education and counseling completed regarding risks and benefits of medications and psychotherapy options. Collaborative Care Psychiatry Service model reviewed today. Recommend therapy for additional support.     Treatment Plan:    Start Lexapro 5 mg daily for 1 week, then increase to 10 mg daily    Continue Wellbutrin  mg daily    Continue all other medical directions per primary care provider.     Continue all other medications as reviewed per electronic medical record today.     Safety plan reviewed. To the Emergency Department as needed or call after hours crisis line at [phone] or [phone]. [place] Crisis Text [place]: Text [place] to 441962  or  Suicide LifeLine Chat: suicidepreventionlifeline.org/chat/    Continue individual therapy as planned with JORDI Adame LP.     Schedule an appointment with me in 4 weeks or sooner as needed.  Call [facility] at [phone] to  schedule.    Follow up with primary care provider as planned or for acute medical concerns.    Call the psychiatric nurse line with medication questions or concerns at [phone].    MyChart may be used to communicate with your provider, but this is not intended to be used for emergencies.      Community Resources:    National Suicide Prevention Lifeline: 348.748.9331 (TTY: 651.137.3208). Call anytime for help.  (www.suicidepreventionlifeline.org)  National [place] on Mental Illness (www.j[place].org): 331.207.4107 or 097-227-2084.   Mental Health Association (www.mentalhealth.org): 160.771.4382 or 021-145-0528.  [place] Crisis Text [place]: Text [place] to 836782  Suicide LifeLine Chat: suicidepreNexterraline.org/chat    Administrative Billing:   Time spent with patient was 60 minutes and greater than 50% of time or 40 minutes was spent in counseling and coordination of care regarding above diagnoses and treatment plan.    Patient Status:  Patient will continue to be seen for ongoing consultation and stabilization.    Signed:   Perry Fernandez, CNP  Psychiatry

## 2019-02-07 NOTE — PROGRESS NOTES
Clinic Care Coordination Contact    Clinic Care Coordination Contact  OUTREACH    Referral Information:  Referral Source: Care Team(Dr. Leal )    Primary Diagnosis: Psychosocial    Chief Complaint   Patient presents with     Clinic Care Coordination - Follow-up        [place] Utilization:   Clinic Utilization  Difficulty keeping appointments:: No  Compliance Concerns: No  No-Show Concerns: No  No PCP office visit in Past Year: No  Utilization    Last refreshed: 2/1/2019  2:09 PM:  Hospital Admissions 0           Last refreshed: 2/1/2019  2:09 PM:  ED Visits 1           Last refreshed: 2/1/2019  2:09 PM:  No Show Count (past year) 1              Current as of: 2/1/2019  2:09 PM              Clinical Concerns:  Pt seeing medical team and care coordintion team regularly to meet his physical, mental health, and psychosocial needs.        Functional Status:  Dependent ADLs:: Ambulation-no assistive device  Dependent IADLs:: Transportation, Independent  Bed or wheelchair confined:: No  Mobility Status: Independent    Living Situation:  Current living arrangement:: Other(currently living with son and daughter in law and grandchildren - they would like him out of the house by end of summer )  Type of residence:: Homeless    Diet/Exercise/Sleep:  Diet:: Regular  Inadequate nutrition (GOAL):: No  Food Insecurity: No  Tube Feeding: No  Exercise:: Yes    Transportation:  Transportation concerns (GOAL):: Yes  Transportation means:: Medical transport     Psychosocial:  Mental health DX:: Yes(patient does not know his diagnosis - has been 10 years since he last saw mental health )  Mental health DX how managed:: None  Mental health management concern (GOAL):: Yes  Informal Support system:: Children     Financial/Insurance:   Financial/Insurance concerns (GOAL):: Yes       Resources and Interventions:  Current Resources:    ;   Community Resources: [facility] Programs([employer] benefits and [place] )  Supplies used at home::  None  Equipment Currently Used at Home: none    Advance Care Plan/Directive  Advanced Care Plans/Directives on file:: No    Referrals Placed:  [facility] Program ([phone]) and [facility] VISPDAT Assessment.    Goals:   Goals        General    1: Financial Wellbeing (pt-stated)     Notes - Note created  7/26/2018 10:52 AM by Iman Dumont LSW    Goal Statement: I will obtain additional financial resources within 1-3 months to support my overall health and to obtain alternative housing.    Measure of Success: I will verbally report my updates to the care coordination team and PCP.   Supportive Steps to Achieve: I will outreach to my financial worker this week and inquire about cash assistance and have this worker send me the necessary forms to obtain additional support.  I will inquire about housing assistance as well when I talk with my financial worker.  I have agreed to outreach to the care coordination team after I talk with my financial worker if questions arise.   Barriers: I am currently residing with my son and may need to leave this residence in the future.    Strengths: I appear motivated to achieve additional financial supports and resources.    Date to Achieve By: 9/1/18        2: Transportation (pt-stated)     Notes - Note created  7/26/2018 10:56 AM by Iman Dumont LSW    Goal Statement: I will obtain rides through my insurance in 1-2 weeks for all upcoming medical appointments to ensure I am able to follow up with providers as recommended.    Measure of Success: I will verbally update care team after I talk with insurance or if questions arise.    Supportive Steps to Achieve: [facility] provided me with the [employer] [place] number to call today to line up medical rides. I was educated on this service today and reminded of the timeline to set up rides.  I can outreach to care coordination team if I have questions or concerns regarding transportation.   Barriers: Family rides are becoming difficult to  line up, therefore I am looking for additional resources.    Strengths: I appear motivated to attend all of my medical and MH appointments.    Date to Achieve By: 8/15/18        Psychosocial (pt-stated)     Notes - Note edited  11/30/2018  1:47 PM by Leonora Dougherty LGSW    Goal Statement: I will be living somewhere better.  Measure of Success: Get on housing lists with [facility]. Move into a [place].  Supportive Steps to Achieve: Work with Care Coordination, maintain appointments with providers, practice coping skills, maintain medication compliance.  Barriers: Homelessness, financial insecurity.  Strengths: Motivated and determined, engaged in Care Coordination.  Date to Achieve By: 1/8/18  Patient expressed understanding of goal: Pt reports understanding and denies any additional questions or concerns at this times. SW CC engaged in AIDET communication during encounter.        Psychosocial (pt-stated)     Notes - Note edited  11/30/2018  1:46 PM by Leonora Dougherty LGSW    Goal Statement: I will have better income and resources..  Measure of Success: Be approved for SMRT and GRH and GRH-2.  Supportive Steps to Achieve: Work with providers and care coordinator to facilitate approvals.  Barriers: Homelessness, financial insecurity.  Strengths: Motivated and determined.  Date to Achieve By: 1/8/18  Patient expressed understanding of goal: Pt reports understanding and denies any additional questions or concerns at this times. SW CC engaged in AIDET communication during encounter.                Patient/Caregiver understanding: Pt expressed understanding of outreach discussion and SWCC utilized AIDET communication techique.     Outreach Frequency: weekly  Future Appointments              In 4 weeks Perry Fernandez, CNP [facility]    In 1 month Andres Leal MD [facility]          Plan: Pt plans to contact [facility] to ask for VISPDAT Assessment  and also to contact [facility] Program to ask if he qualifies for their housing program. He will follow up with [facility] in the next week.

## 2019-02-08 ASSESSMENT — ANXIETY QUESTIONNAIRES: GAD7 TOTAL SCORE: 15

## 2019-02-08 ASSESSMENT — PATIENT HEALTH QUESTIONNAIRE - PHQ9: SUM OF ALL RESPONSES TO PHQ QUESTIONS 1-9: 17

## 2019-02-19 ENCOUNTER — PATIENT OUTREACH (OUTPATIENT)
Dept: CARE COORDINATION | Facility: CLINIC | Age: 59
End: 2019-02-19

## 2019-02-19 ASSESSMENT — ACTIVITIES OF DAILY LIVING (ADL): DEPENDENT_IADLS:: TRANSPORTATION;INDEPENDENT

## 2019-02-19 NOTE — LETTER
[place] CARE COORDINATION  [facility]   February 19, 2019    Bhavin Awad  [address] [facility] [address]      Dear Bhavin,    I recently tried to call and was unable to reach you. Below is a description of clinic care coordination and how I can further assist you.    The clinic care coordinator is a registered nurse and/or  who understand the health care system. The goal of clinic care coordination is to help you manage your health and improve access to the [facility] system in the most efficient manner. The registered nurse can assist you in meeting your health care goals by providing education, coordinating services, and strengthening the communication among your providers. The  can assist you with financial, behavioral, psychosocial, chemical dependency, counseling, and/or psychiatric resources.    Please feel free to contact me at [phone], with any questions or concerns. We at [facility] are focused on providing you with the highest-quality healthcare experience possible and that all starts with you.     Sincerely,     Lainey Foster    Enclosed: I have enclosed a copy of the Complex Care Plan. This has helpful information and goals that we have talked about. Please keep this in an easy to access place to use as needed.

## 2019-02-19 NOTE — LETTER
[facility]  Complex Care Plan  About Me  Patient Name:  Bhavin Awad    YOB: 1960  Age:     58 year old   Tim MRN:   [id number] Telephone Information:  Home Phone [phone]   Mobile [phone]       Address:    [address]  [facility] [address] Email address:  No e-mail address on record      Emergency Contact(s)  Name Relationship Lgl Grd Work Phone Home Phone Mobile Phone   1. MAGALY IVY* Son  none [phone] none           Primary language:  English     needed? No   [facility] [facility]:  [phone] op. 1  Other communication barriers: Literacy barrier  Preferred Method of Communication:  Mail  Current living arrangement: Other(currently living with son and daughter in law and grandchildren - they would like him out of the house by end of summer )  Mobility Status/ Medical Equipment: Independent    Health Maintenance  Health Maintenance Reviewed:      My Access Plan  Medical Emergency 911   Primary Clinic Line [facility] - [phone]   24 Hour Appointment Line [phone] or  [phone]) (toll-free)   24 Hour Nurse Line [phone] (toll-free)   Preferred Urgent Care [facility] [phone]   Preferred Hospital [facility]  [phone]   Preferred Pharmacy [facility] [place], [place] - [address]     Behavioral Health Crisis Line The National Suicide Prevention Lifeline at 1-988.481.4510 or 911     My Care Team Members    Patient Care Team       Relationship Specialty Notifications Start End    Andres Leal MD PCP - General Family Practice  10/11/18     Phone: [phone] Fax: [phone] [address] [address]    Andres Leal MD PCP - Assigned PCP   8/5/18     Phone: [phone] Fax: [phone] [address] [address]    Andres Leal MD MD Family Practice  7/24/18      Phone: [phone] Fax: [phone]         [address] [facility] [address]    Lainey Foster LSW Lead Care Coordinator Primary Care - CC  1/23/19     Phone: [phone]                 My Care Plans  Self Management and Treatment Plan  Goals and (Comments)  Goals        General    Psychosocial (pt-stated)     Notes - Note edited  2/7/2019  2:54 PM by Lainey Foster LSW    Goal Statement: I will be living somewhere better.  Measure of Success: Get on housing lists with [facility]. Move into a [place].  Supportive Steps to Achieve: Work with Care Coordination, maintain appointments with providers, practice coping skills, maintain medication compliance.  Barriers: Homelessness, financial insecurity.  Strengths: Motivated and determined, engaged in Care Coordination.  Date to Achieve By: 1/8/19  Patient expressed understanding of goal: Pt reports understanding and denies any additional questions or concerns at this times. SW CC engaged in AIDET communication during encounter.        Psychosocial (pt-stated)     Notes - Note edited  2/7/2019  2:55 PM by Lainey Foster LSW    Goal Statement: I will have better income and resources..  Measure of Success: Be approved for SSDI.  Supportive Steps to Achieve: Work with providers and care coordinator to facilitate approvals.  Barriers: Homelessness, financial insecurity.  Strengths: Motivated and determined.  Date to Achieve By: 4/30/19  Patient expressed understanding of goal: Pt reports understanding and denies any additional questions or concerns at this times.  CC engaged in AIDET communication during encounter.        Psychosocial (pt-stated)     Notes - Note created  2/7/2019  3:05 PM by Lainey Foster LSW    Goal Statement: In the next month I will contact two revoPT.    Measure of Success: I will have a  familiar with [place] [employer] working with me.      Supportive Steps to Achieve: I will  contact [facility] to request the VISPDAT assessment.  I will call [facility] to ask if I qualify for the Cho program.    Barriers: The process for finding housing is lengthy.    Strengths: I continue to be motivated and determined.     Date to Achieve By: 3/15/19    Patient expressed understanding of goal: Bhavin expressed understanding of the goal.               Action Plans on File:                       Advance Care Plans/Directives Type:        My Medical and Care Information  Problem List   Patient Active Problem List   Diagnosis     Raynaud's syndrome     Primary localized osteoarthrosis, other specified sites     Tobacco use disorder     Hyperlipidemia with target LDL less than 160     Non-cardiac chest pain     Pulmonary nodules     Right knee pain, unspecified chronicity     Left knee pain, unspecified chronicity     Left shoulder pain, unspecified chronicity     Cervicalgia     Learning disability     Homeless     Major depressive disorder     Generalized anxiety disorder     Essential hypertension      Current Medications and Allergies:  See printed Medication Report.    Care Coordination Start Date: 12/17/2018   Frequency of Care Coordination: weekly   Form Last Updated: 02/19/2019

## 2019-02-19 NOTE — PROGRESS NOTES
Clinic Care Coordination Contact  [facility]/Voicemail    Referral Source: Care Team(Dr. Leal )  Clinical Data: Care Coordinator Outreach  Outreach attempted x 1.  Left message on voicemail with call back information and requested return call.  Plan: Care Coordinator mailed Complex Care Plan on 2/19/19. Care Coordinator  Will follow up in 7-10 business days.    VANDANA Pelaez   Care Coordination Team  [phone]

## 2019-03-04 NOTE — PROGRESS NOTES
Clinic Care Coordination Contact  [facility]/Voicemail    Referral Source: Care Team(Dr. Leal )  Clinical Data: Care Coordinator Outreach  Outreach attempted x 2.  Left message on voicemail with call back information and requested return call.  Plan:  Care Coordinator will try to reach patient again in two weeks.    VANDANA Pelaez   Care Coordination Team  [phone]

## 2019-03-08 ENCOUNTER — OFFICE VISIT (OUTPATIENT)
Dept: PSYCHIATRY | Facility: CLINIC | Age: 59
End: 2019-03-08
Payer: COMMERCIAL

## 2019-03-08 VITALS
TEMPERATURE: 97.7 F | OXYGEN SATURATION: 97 % | SYSTOLIC BLOOD PRESSURE: 133 MMHG | HEART RATE: 73 BPM | RESPIRATION RATE: 16 BRPM | WEIGHT: 173 LBS | BODY MASS INDEX: 25.55 KG/M2 | DIASTOLIC BLOOD PRESSURE: 86 MMHG

## 2019-03-08 DIAGNOSIS — F33.1 MODERATE EPISODE OF RECURRENT MAJOR DEPRESSIVE DISORDER (H): Primary | ICD-10-CM

## 2019-03-08 DIAGNOSIS — F41.1 GENERALIZED ANXIETY DISORDER: ICD-10-CM

## 2019-03-08 PROCEDURE — 99214 OFFICE O/P EST MOD 30 MIN: CPT | Performed by: NURSE PRACTITIONER

## 2019-03-08 ASSESSMENT — ANXIETY QUESTIONNAIRES
GAD7 TOTAL SCORE: 15
GAD7 TOTAL SCORE: 15
2. NOT BEING ABLE TO STOP OR CONTROL WORRYING: MORE THAN HALF THE DAYS
7. FEELING AFRAID AS IF SOMETHING AWFUL MIGHT HAPPEN: SEVERAL DAYS
4. TROUBLE RELAXING: MORE THAN HALF THE DAYS
6. BECOMING EASILY ANNOYED OR IRRITABLE: NEARLY EVERY DAY
3. WORRYING TOO MUCH ABOUT DIFFERENT THINGS: NEARLY EVERY DAY
7. FEELING AFRAID AS IF SOMETHING AWFUL MIGHT HAPPEN: SEVERAL DAYS
GAD7 TOTAL SCORE: 15
1. FEELING NERVOUS, ANXIOUS, OR ON EDGE: MORE THAN HALF THE DAYS
5. BEING SO RESTLESS THAT IT IS HARD TO SIT STILL: MORE THAN HALF THE DAYS

## 2019-03-08 ASSESSMENT — PATIENT HEALTH QUESTIONNAIRE - PHQ9
SUM OF ALL RESPONSES TO PHQ QUESTIONS 1-9: 16
SUM OF ALL RESPONSES TO PHQ QUESTIONS 1-9: 16
10. IF YOU CHECKED OFF ANY PROBLEMS, HOW DIFFICULT HAVE THESE PROBLEMS MADE IT FOR YOU TO DO YOUR WORK, TAKE CARE OF THINGS AT HOME, OR GET ALONG WITH OTHER PEOPLE: VERY DIFFICULT

## 2019-03-08 NOTE — PROGRESS NOTES
"    Outpatient Psychiatric Progress Note    Name: Bhavin Awad   : 1960                    Primary Care Provider: Andres Leal MD              Last visit: 2018  Current Psychotherapist: Denisse Adame              Last visit: 2019    PHQ-9 scores:  PHQ-9 SCORE 2019 2019 3/8/2019   PHQ-9 Total Score MyChart 16 (Moderately severe depression) 17 (Moderately severe depression) 16 (Moderately severe depression)   PHQ-9 Total Score 16 17 16       JOHNNIE-7 scores:  JOHNNIE-7 SCORE 2019 2019 3/8/2019   Total Score 16 (severe anxiety) 15 (severe anxiety) 15 (severe anxiety)   Total Score 16 15 15         Answers for HPI/ROS submitted by the patient on 3/8/2019   If you checked off any problems, how difficult have these problems made it for you to do your work, take care of things at home, or get along with other people?: Very difficult  PHQ9 TOTAL SCORE: 16  JOHNNIE 7 TOTAL SCORE: 15      Patient Identification:  Patient is a 58 year old year old,   White American male  who presents for return visit with me.  Patient is currently unemployed. Patient attended the session alone. Patient prefers to be called: \"Bhavin\".    Interim History:  I last saw Bahvin Awad for outpatient psychiatry Consultation on 19.     During that appointment, we reviewed his psychiatric history and recent ongoing stressors of homelessness and being cut-off from family. We agreed to maintain his Wellbutrin, as at least he was not having issues with this. As he had never taken any other antidepressant before, we agreed to a trial of Lexapro. He was to continue psychotherapy sessions with JORDI Adame LP at [facility].    Current medications include:   Current Outpatient Medications   Medication Sig     buPROPion (WELLBUTRIN XL) 300 MG 24 hr tablet Take 1 tablet (300 mg) by mouth every morning     escitalopram (LEXAPRO) 10 MG tablet Take 1 tablet (10 mg) by mouth daily     IBUPROFEN PO Take 200 mg by mouth every 4 " hours as needed for moderate pain (Over 1200mg a day)     nabumetone (RELAFEN) 750 MG tablet Take 1 tablet (750 mg) by mouth daily     topiramate (TOPAMAX) 100 MG tablet Take 1 tablet (100 mg) by mouth daily Use second     No current facility-administered medications for this visit.        The [place] Prescription Monitoring Program has been reviewed and there are no concerns about diversionary activity for controlled substances at this time.      I was able to review most recent Primary Care Provider, specialty provider, and therapy visit notes that I have access to.     Today, patient reports initiating Lexapro 1 month ago, now at 10 mg daily. States he's had persistent headache, nausea and poor appetite, none of which have appeared to decrease in his 4 weeks of use. States he has noted perhaps some mild therapeutic effect as feels less guarded and more able to socialize at the shelter, though this may be equally attributable to him adjusting to living at shelter.    Still anxious and depressed. Still overwhelmed and frustrated with obtaining temporary housing. He needs his birth certificate for application, but his having difficulty obtaining [place] Working with  at shelter as well as [facility]; feeling overwhelmed.    He has not returned to therapy with Dr. Adame at [facility]; [facility] appts were booked out 2 months in advance at a time, and he needs more.    Continue to use cannabis daily; helpful in managing Lexapro side effects.      Past Medical History:   Diagnosis Date     Cervicalgia 7/24/2018     DEPRESS PSYCHOSIS-SEVERE(aka DEPRESSION) 6/16/2004    admitted [facility] in june 2004 , dr gonzales psychiatrist, significant suicidal ideation, and attempts       Essential hypertension 10/2/2018     Homeless 7/24/2018     LBP (low back pain)      Learning disability 7/24/2018     Left knee pain, unspecified chronicity 7/24/2018     Left  shoulder pain, unspecified chronicity 7/24/2018     Non-cardiac chest pain 7/24/2018     Pulmonary nodules 7/24/2018     Right knee pain, unspecified chronicity 7/24/2018     Rotator cuff disorder       has a past medical history of Cervicalgia (7/24/2018), DEPRESS PSYCHOSIS-SEVERE(aka DEPRESSION) (6/16/2004), Essential hypertension (10/2/2018), Homeless (7/24/2018), LBP (low back pain), Learning disability (7/24/2018), Left knee pain, unspecified chronicity (7/24/2018), Left shoulder pain, unspecified chronicity (7/24/2018), Non-cardiac chest pain (7/24/2018), Pulmonary nodules (7/24/2018), Right knee pain, unspecified chronicity (7/24/2018), and Rotator cuff disorder.    Social history updates:  Got to see grandchild's basketball game recently; was a major highlight.    Substance use updates:  Long-standing cannabis dependence  No ETOH  No substance use.      Vital Signs:   /86 (BP Location: Right arm, Patient Position: Chair, Cuff Size: Adult Large)   Pulse 73   Temp 97.7  F (36.5  C) (Oral)   Resp 16   Wt 78.5 kg (173 lb)   SpO2 97%   BMI 25.55 kg/m      Labs:  Most recent laboratory results reviewed and no new labs.    Review of Systems:  10 systems (general, cardiovascular, respiratory, eyes, ENT, endocrine, GI, , M/S, neurological) were reviewed. Most pertinent finding(s) is/are: back and knee pain. The remaining systems are all unremarkable.    Mental Status Examination:     Appearance:  awake, alert and adequately groomed  Attitude:  cooperative   Eye Contact:  good  Gait and Station: Normal  Psychomotor Behavior:  intact station, gait and muscle tone  Oriented to:  time, person, and place  Attention Span and Concentration:  Normal  Speech:  clear, coherent  Mood:  anxious and depressed  Affect:  appropriate and in normal range  Associations:  no loose associations  Thought Process:  logical, linear and goal oriented  Thought Content:  Appropriate to Interview  Recent and Remote Memory:   intact Not formally assessed. No amnesia.  Fund of Knowledge: appropriate  Insight:  good  Judgment:  intact  Impulse Control:  intact     Suicide Risk Assessment:  Today Bhavin Awad denies any suicidal ideation or self-harm impulses. Therefore, based on all available evidence including the factors cited above, Bhavin Awad does not appear to be at imminent risk for self-harm, does not meet criteria for a 72-hr hold, and therefore remains appropriate for ongoing outpatient level of care.  A thorough assessment of risk factors related to suicide and self-harm have been reviewed and are noted above. The patient convincingly denies acute suicidality on several occasions. Local community safety resources reviewed and printed for patient to use if needed. There was no deceit detected, and the patient presented in a manner that was believable.      DSM5  Diagnosis:  296.32 (F33.1) Major Depressive Disorder, Recurrent Episode, Moderate _  300.02 (F41.1) Generalized Anxiety Disorder      Medical comorbidities include:   Patient Active Problem List    Diagnosis Date Noted     Essential hypertension 10/02/2018     Priority: Medium     Major depressive disorder 08/28/2018     Priority: Medium     Generalized anxiety disorder 08/28/2018     Priority: Medium     Non-cardiac chest pain 07/24/2018     Priority: Medium     Pulmonary nodules 07/24/2018     Priority: Medium     Right knee pain, unspecified chronicity 07/24/2018     Priority: Medium     Left knee pain, unspecified chronicity 07/24/2018     Priority: Medium     Left shoulder pain, unspecified chronicity 07/24/2018     Priority: Medium     Cervicalgia 07/24/2018     Priority: Medium     Learning disability 07/24/2018     Priority: Medium     illiterate       Homeless 07/24/2018     Priority: Medium     Hyperlipidemia with target LDL less than 160 08/11/2015     Priority: Medium     Tobacco use disorder 07/09/2015     Priority: Medium     Primary localized  osteoarthrosis, other specified sites 06/07/2006     Priority: Medium     Raynaud's syndrome 04/18/2003     Priority: Medium         Assessment:  Bhavin Awad reports persistent and troublesome side effects with initiation of Lexapro -- given their protraction and pronouncement, I would not see Lexapro being a viable antidepressant. I recommend he cross-taper to a trial of Zoloft. We can otherwise continue Wellbutrin  mg daily.    Given the limited availability of our clinic psychologist, I will be putting him in contact with [facility] to facilitate at community therapy referral.    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 I believe his obtainment of stable housing should be a priority towards his mental health, and hopefully in time he could also mend some of his family relationships.       Medication side effects and alternatives were reviewed. Health promotion activities recommended and reviewed today. All questions addressed. Education and counseling completed regarding risks and benefits of medications and psychotherapy options.    Treatment Plan:    Cross taper Lexapro to Zoloft    Week 1: Lexapro 5 mg daily + Zoloft 25 mg daily    Week 2: Stop Lexapro. Increase Zoloft to 50 mg daily    Continue Wellbutrin  mg daily    Continue all other medical directions per primary care provider.     Continue all other medications as reviewed per electronic medical record today.     Safety plan reviewed. To the Emergency Department as needed or call after hours crisis line at [phone] or [phone]. [place] Crisis Text Line: Text [place] to 251645  or  Suicide LifeLine Chat: suicidepreventionlifeline.org/chat/    Referred to [facility] for therapy referral    Schedule an appointment with me in 4-5 weeks or sooner as needed.  Call [facility] at [phone] to schedule.    Follow up  with primary care provider as planned or for acute medical concerns.    Call the psychiatric nurse line with medication questions or concerns at [phone].    MyChart may be used to communicate with your provider, but this is not intended to be used for emergencies.    Administrative Billing:   Time spent with patient was 30 minutes and greater than 50% of time or 20 minutes was spent in counseling and coordination of care regarding above diagnoses and treatment plan.    Patient Status:  Patient will continue to be seen for ongoing consultation and stabilization.    Signed:   Perry Fernandez CNP   Psychiatry

## 2019-03-08 NOTE — PATIENT INSTRUCTIONS
-- Decrease Lexapro to 5 mg (1/2 tab) daily, and start Zoloft 25 mg (1/2 tab) daily that same week    -- Then stop Lexapro, and increase Zoloft to 50 mg (1 full tab) daily    -- [facility] will contact you about setting up therapy    -- See me in 4-5 weeks

## 2019-03-09 ASSESSMENT — PATIENT HEALTH QUESTIONNAIRE - PHQ9: SUM OF ALL RESPONSES TO PHQ QUESTIONS 1-9: 16

## 2019-03-09 ASSESSMENT — ANXIETY QUESTIONNAIRES: GAD7 TOTAL SCORE: 15

## 2019-03-13 NOTE — PROGRESS NOTES
"\"Patient was contacted by [facility]. Patient was scheduled for therapy services with Juan J Seaman on 3.22.19 at 11am.   Lynsey Blanco   , [facility]\"  "

## 2019-03-25 ENCOUNTER — OFFICE VISIT (OUTPATIENT)
Dept: FAMILY MEDICINE | Facility: CLINIC | Age: 59
End: 2019-03-25
Payer: COMMERCIAL

## 2019-03-25 VITALS
TEMPERATURE: 98.5 F | BODY MASS INDEX: 25.62 KG/M2 | OXYGEN SATURATION: 97 % | WEIGHT: 173 LBS | RESPIRATION RATE: 14 BRPM | HEART RATE: 66 BPM | SYSTOLIC BLOOD PRESSURE: 118 MMHG | DIASTOLIC BLOOD PRESSURE: 60 MMHG | HEIGHT: 69 IN

## 2019-03-25 DIAGNOSIS — M25.551 HIP PAIN, RIGHT: ICD-10-CM

## 2019-03-25 DIAGNOSIS — F41.1 GENERALIZED ANXIETY DISORDER: ICD-10-CM

## 2019-03-25 DIAGNOSIS — M54.41 MIDLINE LOW BACK PAIN WITH RIGHT-SIDED SCIATICA, UNSPECIFIED CHRONICITY: ICD-10-CM

## 2019-03-25 DIAGNOSIS — M25.561 RIGHT KNEE PAIN, UNSPECIFIED CHRONICITY: ICD-10-CM

## 2019-03-25 DIAGNOSIS — M25.562 LEFT KNEE PAIN, UNSPECIFIED CHRONICITY: ICD-10-CM

## 2019-03-25 DIAGNOSIS — M54.2 CERVICALGIA: ICD-10-CM

## 2019-03-25 DIAGNOSIS — F33.1 MODERATE EPISODE OF RECURRENT MAJOR DEPRESSIVE DISORDER (H): ICD-10-CM

## 2019-03-25 PROCEDURE — 99214 OFFICE O/P EST MOD 30 MIN: CPT | Performed by: PHYSICIAN ASSISTANT

## 2019-03-25 RX ORDER — TOPIRAMATE 100 MG/1
100 TABLET, FILM COATED ORAL DAILY
Qty: 30 TABLET | Refills: 2 | Status: SHIPPED | OUTPATIENT
Start: 2019-03-25

## 2019-03-25 RX ORDER — BUPROPION HYDROCHLORIDE 300 MG/1
300 TABLET ORAL EVERY MORNING
Qty: 30 TABLET | Refills: 2 | Status: SHIPPED | OUTPATIENT
Start: 2019-03-25

## 2019-03-25 ASSESSMENT — ANXIETY QUESTIONNAIRES
GAD7 TOTAL SCORE: 14
2. NOT BEING ABLE TO STOP OR CONTROL WORRYING: MORE THAN HALF THE DAYS
1. FEELING NERVOUS, ANXIOUS, OR ON EDGE: MORE THAN HALF THE DAYS
7. FEELING AFRAID AS IF SOMETHING AWFUL MIGHT HAPPEN: MORE THAN HALF THE DAYS
3. WORRYING TOO MUCH ABOUT DIFFERENT THINGS: MORE THAN HALF THE DAYS
6. BECOMING EASILY ANNOYED OR IRRITABLE: MORE THAN HALF THE DAYS
7. FEELING AFRAID AS IF SOMETHING AWFUL MIGHT HAPPEN: MORE THAN HALF THE DAYS
5. BEING SO RESTLESS THAT IT IS HARD TO SIT STILL: MORE THAN HALF THE DAYS
4. TROUBLE RELAXING: MORE THAN HALF THE DAYS

## 2019-03-25 ASSESSMENT — PATIENT HEALTH QUESTIONNAIRE - PHQ9
10. IF YOU CHECKED OFF ANY PROBLEMS, HOW DIFFICULT HAVE THESE PROBLEMS MADE IT FOR YOU TO DO YOUR WORK, TAKE CARE OF THINGS AT HOME, OR GET ALONG WITH OTHER PEOPLE: VERY DIFFICULT
SUM OF ALL RESPONSES TO PHQ QUESTIONS 1-9: 15
SUM OF ALL RESPONSES TO PHQ QUESTIONS 1-9: 15

## 2019-03-25 ASSESSMENT — MIFFLIN-ST. JEOR: SCORE: 1605.1

## 2019-03-25 NOTE — PROGRESS NOTES
SUBJECTIVE:   Bhavin Awad is a 58 year old male who presents to clinic today for the following health issues:      History of Present Illness     Depression & Anxiety Follow-up:     Depression/Anxiety:  Depression & Anxiety    Status since last visit::  Stable    Other associated symptoms of depression and anxiety::  YES    Significant life event::  YES    Current substance use::  Cannabis and Prescription Drugs       Today's PHQ-9         PHQ-9 Total Score:     (P) 15   PHQ-9 Q9 Suicidal ideation:   (P) Not at all   Thoughts of suicide or self harm:      Self-harm Plan:        Self-harm Action:          Safety concerns for self or others:       JOHNNIE-7 Total Score: (P) 14    Diet:  Other  Frequency of exercise:  2-3 days/week  Duration of exercise:  30-45 minutes  Taking medications regularly:  Yes  Medication side effects:  None  Additional concerns today:  Yes      Problem list and histories reviewed & adjusted, as indicated.  Additional history: as documented    Patient is following up for q3 month visit. pcp is Dr. Leal who is out of the office. Overall, see collaborative psych and recently changed zoloft to lexapro. Slight improvement, but only taking full zoloft dose for 1 week. Has been out of wellbutrin and Topamax for 2 weeks due to cost. Needing refills renewed of these. Has noticed pain has worsened since being off of these medications.      Patient Active Problem List   Diagnosis     Raynaud's syndrome     Primary localized osteoarthrosis, other specified sites     Tobacco use disorder     Hyperlipidemia with target LDL less than 160     Non-cardiac chest pain     Pulmonary nodules     Right knee pain, unspecified chronicity     Left knee pain, unspecified chronicity     Left shoulder pain, unspecified chronicity     Cervicalgia     Learning disability     Homeless     Major depressive disorder     Generalized anxiety disorder     Essential hypertension     Past Surgical History:   Procedure  Laterality Date     C EXPLORE SHOULDER JOINT      Arthroplasty, Shoulder, simonet, rotator cuff     HERNIORRHAPHY INGUINAL BILATERAL Bilateral 2015    Procedure: HERNIORRHAPHY INGUINAL BILATERAL;  Surgeon: Luís Conti MD;  Location: [facility] OR       Social History     Tobacco Use     Smoking status: Current Every Day Smoker     Packs/day: 1.00     Smokeless tobacco: Never Used   Substance Use Topics     Alcohol use: No     Comment: sobriety one year. None     Family History   Problem Relation Age of Onset     C.A.D. Father          age 66 MI      Cerebrovascular Disease Father      C.A.CAMRON. Paternal Grandfather          in his 50 heart disease     Diabetes Mother          Current Outpatient Medications   Medication Sig Dispense Refill     buPROPion (WELLBUTRIN XL) 300 MG 24 hr tablet Take 1 tablet (300 mg) by mouth every morning 30 tablet 2     IBUPROFEN PO Take 200 mg by mouth every 4 hours as needed for moderate pain (Over 1200mg a day)       nabumetone (RELAFEN) 750 MG tablet Take 1 tablet (750 mg) by mouth daily 30 tablet 2     ranitidine (ZANTAC) 150 MG tablet Take 1 tablet (150 mg) by mouth 2 times daily 60 tablet 2     sertraline (ZOLOFT) 50 MG tablet Take 1 tablet (50 mg) by mouth daily 30 tablet 1     topiramate (TOPAMAX) 100 MG tablet Take 1 tablet (100 mg) by mouth daily Use second 30 tablet 2     Allergies   Allergen Reactions     No Known Drug Allergies      Recent Labs   Lab Test 19  1536 18  1830 18  1829 08/10/15  0822   LDL  --   --   --  179*   HDL  --   --   --  40*   TRIG  --   --   --  124   ALT 29  --  42 38   CR 0.82  --  0.79 0.74   GFRESTIMATED >90 >90 >90 >90  Non African American GFR Calc     GFRESTBLACK >90 >90 >90 >90  African American GFR Calc     POTASSIUM 4.7  --  3.8 4.2   TSH 1.74  --   --   --         ROS:  Constitutional, msk, neuro, psych, cardiovascular, pulmonary, gi and gu systems are negative, except as otherwise noted.    OBJECTIVE:     BP  "118/60 (BP Location: Right arm, Patient Position: Chair, Cuff Size: Adult Regular)   Pulse 66   Temp 98.5  F (36.9  C) (Oral)   Resp 14   Ht 1.753 m (5' 9\")   Wt 78.5 kg (173 lb)   SpO2 97%   BMI 25.55 kg/m    Body mass index is 25.55 kg/m .  GENERAL: healthy, alert and no distress  RESP: lungs clear to auscultation - no rales, rhonchi or wheezes  CV: regular rates and rhythm, normal S1 S2, no S3 or S4 and no murmur, click or rub  PSYCH: mentation appears normal, affect normal/bright    Diagnostic Test Results:  none     ASSESSMENT/PLAN:     (F41.1) Generalized anxiety disorder  Comment: followed by psych. Stable. Maintain follow up. Will renew meds today. Maintain pcp follow ups in 3 months  Plan: buPROPion (WELLBUTRIN XL) 300 MG 24 hr tablet,         ranitidine (ZANTAC) 150 MG tablet        -Medication use and side effects discussed with the patient. Patient is in complete understanding and agreement with plan.       (F33.1) Moderate episode of recurrent major depressive disorder (H)  Comment: as above. Given relafen and ssri use, discussed concerns for GI bleed. Zantac discussed as possible preventative aid. Patient interested in starting this.   Plan: buPROPion (WELLBUTRIN XL) 300 MG 24 hr tablet,         ranitidine (ZANTAC) 150 MG tablet        -Medication use and side effects discussed with the patient. Patient is in complete understanding and agreement with plan.       (M25.631) Hip pain, right  Comment: stable as above   Plan: topiramate (TOPAMAX) 100 MG tablet            (M54.41) Midline low back pain with right-sided sciatica, unspecified chronicity  Comment: as above   Plan: topiramate (TOPAMAX) 100 MG tablet, nabumetone         (RELAFEN) 750 MG tablet, buPROPion (WELLBUTRIN         XL) 300 MG 24 hr tablet, ranitidine (ZANTAC)         150 MG tablet            (M54.2) Cervicalgia  Comment: as above   Plan: nabumetone (RELAFEN) 750 MG tablet, ranitidine         (ZANTAC) 150 MG tablet        "     (M25.562) Left knee pain, unspecified chronicity  Comment: as above   Plan: nabumetone (RELAFEN) 750 MG tablet, ranitidine         (ZANTAC) 150 MG tablet            (M25.561) Right knee pain, unspecified chronicity  Comment: as above   Plan: nabumetone (RELAFEN) 750 MG tablet, ranitidine         (ZANTAC) 150 MG tablet              Follow up: as above     Dima Hinds PA-C  [facility]

## 2019-03-26 ASSESSMENT — ANXIETY QUESTIONNAIRES: GAD7 TOTAL SCORE: 14

## 2019-03-26 ASSESSMENT — PATIENT HEALTH QUESTIONNAIRE - PHQ9: SUM OF ALL RESPONSES TO PHQ QUESTIONS 1-9: 15

## 2019-04-16 ENCOUNTER — PATIENT OUTREACH (OUTPATIENT)
Dept: CARE COORDINATION | Facility: CLINIC | Age: 59
End: 2019-04-16

## 2019-04-16 ASSESSMENT — ACTIVITIES OF DAILY LIVING (ADL): DEPENDENT_IADLS:: TRANSPORTATION;INDEPENDENT

## 2019-04-16 NOTE — LETTER
[place] CARE COORDINATION  [facility]   May 16, 2019    Bhavin Awad  [address] [address] [address]      Dear Bhavin,    I am a clinic care coordinator who works with Andres Leal MD at [facility] . I recently tried to call and was unable to reach you.     The clinic care coordinator is a registered nurse and/or  who understand the health care system. The goal of clinic care coordination is to help you manage your health and improve access to the [facility] in the most efficient manner. The registered nurse can assist you in meeting your health care goals by providing education, coordinating services, and strengthening the communication among your providers. The  can assist you with financial, behavioral, psychosocial, chemical dependency, counseling, and/or psychiatric resources.    Please feel free to contact me at [phone], with any questions or concerns. We at [facility] are focused on providing you with the highest-quality healthcare experience possible and that all starts with you.     Sincerely,     Lainey Foster

## 2019-04-16 NOTE — PROGRESS NOTES
Clinic Care Coordination Contact  [facility]/Voicemail    Referral Source: Care Team(Dr. Leal )  Clinical Data: Care Coordinator Outreach  Outreach attempted x 3.  Unable to leave message with call back information..  Patient left message on [facility] voicemail on 3/15/19  That he has been overwhelmed and focusing on his mental health and that he was taking a break from looking for housing.    Plan:  Care Coordinator will attempt to check in wit Pt when he is in clinic for scheduled appointment on 5/6/19.      VANDANA Pelaez   Care Coordination Team  [phone]

## 2019-05-10 ENCOUNTER — FCC EXTENDED DOCUMENTATION (OUTPATIENT)
Dept: PSYCHOLOGY | Facility: CLINIC | Age: 59
End: 2019-05-10

## 2019-05-10 NOTE — PROGRESS NOTES
Discharge Summary  Multiple Sessions    Client Name: Bhavin Awad MRN#: [id number] YOB: 1960      Intake / Discharge Date: 8-28-18   /   5-10-19      DSM5 Diagnoses: (Sustained by DSM5 Criteria Listed Above)  Diagnoses: JOHNNIE, MDD  Psychosocial & Contextual Factors: multiple stressors  WHODAS 2.0 (12 item) Score: not avail          Presenting Concern:  Medical   financial   mood      Reason for Discharge:  client does not want to continue therapy.   But is willing to see his pcp and psychiatry provider      Disposition at Time of Last Encounter:   Comments:   stable     Risk Management:   Client denies a history of suicidal ideation, suicide attempts, self-injurious behavior, homicidal ideation, homicidal behavior and and other safety concerns  A safety and risk management plan has not been developed at this time, however client was given the after-hours number / 911 should there be a change in any of these risk factors.      Referred To:  Care coordination  Continue with med len Adame LP   5/10/2019

## 2019-05-16 NOTE — PROGRESS NOTES
Clinic Care Coordination Contact  [facility]/Voicemail    Referral Source: Care Team(Dr. Leal )  Clinical Data: Care Coordinator Outreach  Outreach attempted x 4.  Left message on voicemail with call back information and requested return call.  Plan: Care Coordinator mailed out care coordination introduction letter on 5/16/19. Care Coordinator will do no further outreaches at this time.    VANDANA Pelaez   Care Coordination Team  [phone]

## 2019-11-20 NOTE — PROGRESS NOTES
Clinic Care Coordination Contact  [facility]       Clinical Data: Care Coordinator Outreach to follow up on counseling appointment, financial worker contact, and transportation resources discussed last outreach.  Per chart review, Pt has a counseling appointment scheduled for 8/28/18 and a follow up PCP appointment scheduled for 9/4/18.     Outreach attempted x 1.  No answer and unable to leave a voicemail.     Plan: Care Coordinator will attempt to follow up with Pt at a later date if no return call.      Clinic Care Coordination Contact  [facility]       Clinical Data: Care Coordinator Outreach to follow up on counseling appointment, financial worker contact, and transportation resources discussed last outreach.  Per chart review, Pt has a counseling appointment scheduled for 8/28/18 and a follow up PCP appointment scheduled for 9/4/18.     Outreach attempted x 2.  No answer and unable to leave a voicemail.     Plan: Care Coordinator will attempt to follow up with Pt at a later date if no return call.      Clinic Care Coordination Contact    Clinic Care Coordination Contact  OUTREACH    Referral Information:  Primary Diagnosis: Psychosocial    Chief Complaint   Patient presents with     Clinic Care Coordination - Follow-up     Follow up on counseling, financial resources, transportation resources        Universal Utilization: Pt sees PCP at [facility].  Pt has an upcoming PCP appointment scheduled on 9/4/18.  Pt also has a therapy appointment scheduled on 8/28/18 with [facility]. No ED/IP visits since last outreach.   Clinic Utilization  Difficulty keeping appointments:: No  Utilization    Last refreshed: 8/20/2018  9:46 AM:  No Show Count (past year) 0       Last refreshed: 8/20/2018  9:46 AM:  ED visits 1       Last refreshed: 8/20/2018  9:46 AM:  Hospital admissions 0          Current as of: 8/20/2018  9:46 AM           Clinical Concerns:  Current Medical Concerns:  No  Detail Level: Detailed Additional Notes: Patient states pain 0/10 medical concerns addressed today.  Pt has worked with RN[facility] in the past regarding medical health concerns.        Current Behavioral Concerns: Pt reported he continues to have symptoms of anxiety.  Pt reported he is currently safe at home and reported no safety concerns.  Pt reported he is feeling anxious because he is attempting to obtain social security and looking for alternative housing.  Pt scheduled a counseling appointment with [facility] on 8/28/18.  Pt reported he is able to walk to this appointment because it is very close to his home.     Pain (GOAL):: No  Health Maintenance Reviewed: Up to date  Clinical Pathway: NA    Living Situation: Pt looking for alternative housing.  Currently living with family.  Pt attempting to obtain social security income and additional financial supports through the [facility].  Pt has outreached to financial worker with [facility] and plans to call her back again this week to check on progress of paperwork.         Diet/Exercise/Sleep:  Diet:: Regular  Inadequate nutrition (GOAL):: No  Food Insecurity: No  Tube Feeding: No    Transportation:  Transportation concerns (GOAL):: Yes  Transportation means:: Family, Medical transport  Insurance transportation resource was provided to Pt at the time of last outreach.  Pt reported his counseling appointment is very close to his home and he feels comfortable walking to this appointment.     Psychosocial:  Financial/Insurance:   Financial/Insurance concerns (GOAL):: Yes    Pt has outreached to his financial worker with [facility].  Offered care coordination team to outreach to financial worker on his behalf as well if needed.  Pt reported he will call back his financial worker this week and will outreach to clinic directly to talk with care coordination team if he would like outreach from clinic as well.  Pt attempting to obtain additional financial supports and social security income.       Resources and  Interventions:  Current Resources: Financial worker with [facility].  Therapist with [facility].        Goals:   Goals        General    1: Financial Wellbeing (pt-stated)     Notes - Note created  7/26/2018 10:52 AM by Iman Dumont LSW    Goal Statement: I will obtain additional financial resources within 1-3 months to support my overall health and to obtain alternative housing.    Measure of Success: I will verbally report my updates to the care coordination team and PCP.   Supportive Steps to Achieve: I will outreach to my financial worker this week and inquire about cash assistance and have this worker send me the necessary forms to obtain additional support.  I will inquire about housing assistance as well when I talk with my financial worker.  I have agreed to outreach to the care coordination team after I talk with my financial worker if questions arise.   Barriers: I am currently residing with my son and may need to leave this residence in the future.    Strengths: I appear motivated to achieve additional financial supports and resources.    Date to Achieve By: 9/1/18        2: Transportation (pt-stated)     Notes - Note created  7/26/2018 10:56 AM by Iman Dumont LSW    Goal Statement: I will obtain rides through my insurance in 1-2 weeks for all upcoming medical appointments to ensure I am able to follow up with providers as recommended.    Measure of Success: I will verbally update care team after I talk with insurance or if questions arise.    Supportive Steps to Achieve: [facility] provided me with the [employer] number to call today to line up medical rides. I was educated on this service today and reminded of the timeline to set up rides.  I can outreach to care coordination team if I have questions or concerns regarding transportation.   Barriers: Family rides are becoming difficult to line up, therefore I am looking for additional resources.    Strengths: I appear motivated to  Quality 131: Pain Assessment And Follow-Up: Pain assessment using a standardized tool is documented as negative, no follow-up plan required Quality 130: Documentation Of Current Medications In The Medical Record: Current Medications Documented attend all of my medical and MH appointments.    Date to Achieve By: 8/15/18            Patient/Caregiver understanding: Pt voiced his understanding and agreed to outreach to clinic directly to speak with a care coordinator.  Pt is also open to outreach from [facility] next week and also a different SWCC in the future if needed.  This Writer is transitioning out of role, therefore informed Pt that a different care coordinator would outreach to follow up and he was in agreement with this plan.  Also encouraged Pt to call clinic directly before that outreach if needed and he agreed.     Outreach Frequency: 2 weeks  Future Appointments              In 6 days Sharon Adame LP [facility]    In 1 week Andres Leal MD [facility]          Plan: Pt will outreach to financial worker this week to inquire about additional supports and to obtain an update on social security and cash assistance paperwork.  RN[facility] will outreach to Pt next week to follow up.  Pt will outreach to clinic before that time if needed.  RN[facility] can refer Pt to a different SWCC in the future if additional needs arise and for continual follow up.  Pt was in agreement with this plan.

## 2020-02-20 ENCOUNTER — TELEPHONE (OUTPATIENT)
Dept: FAMILY MEDICINE | Facility: CLINIC | Age: 60
End: 2020-02-20

## 2020-02-20 NOTE — LETTER
[facility]  [id number] [address] [id number] [phone]  February 20, 2020    Bhavin Awad  [address] [address] [address]        Dear Bhavin,    I care about your health and have reviewed your health plan. I have reviewed your medical conditions, medication list, and lab results and am making recommendations based on this review, to better manage your health.    You are in particular need of attention regarding:  -Colon Cancer Screening    I am recommending that you:  {recommendations:-schedule a FOLLOWUP OFFICE APPOINTMENT with me.  I will recheck your: Lipids (fasting cholesterol - nothing to eat except water and/or meds for 8+ hours) test.  -schedule a COLONOSCOPY to look for colon cancer (due every 10 years or 5 years in higher risk situations.)   Colon cancer is now the second leading cause of death in the [place] for both men and women and there are over 130,000 new cases and 50,000 deaths per year from colon cancer.  Colonoscopies can prevent 90-95% of these deaths.  Problem lesions can be removed before they ever become cancer.  This test is not only looking for cancer, but also getting rid of precancerious lesions.  If you do not wish to do a colonoscopy or cannot afford to do one, at this time, there is another option. It is called a FIT test or Fecal Immunochemical Occult Blood Test (take home stool sample kit).  It does not replace the colonoscopy for colorectal cancer screening, but it can detect hidden bleeding in the lower colon.  It does need to be repeated every year and if a positive result is obtained, you would be referred for a colonoscopy.  If you have completed either one of these tests at another facility, please have the records sent to our clinic so that we can best coordinate your care.                  Please call us at [phone] (or use [employer]) to address the above recommendations.     Thank you for  trusting [facility] and we appreciate the opportunity to serve you.  We look forward to supporting your healthcare needs in the future.    Martha Awad    [facility] Team

## 2020-02-20 NOTE — TELEPHONE ENCOUNTER
Summary:    Patient is due/failing the following:   Med check and FIT    Action needed:   Patient needs office visit for anxiety/depression med check follow up and colonoscopy or FIT test.    Type of outreach:    Sent letter. patient not accepting incoming calls.                                                    FRANCHESKA Amezquita